# Patient Record
Sex: MALE | Race: WHITE | Employment: OTHER | URBAN - METROPOLITAN AREA
[De-identification: names, ages, dates, MRNs, and addresses within clinical notes are randomized per-mention and may not be internally consistent; named-entity substitution may affect disease eponyms.]

---

## 2017-07-28 LAB
AVERAGE GLUCOSE: NORMAL
HBA1C MFR BLD: 8.6 %

## 2017-09-06 ENCOUNTER — OFFICE VISIT (OUTPATIENT)
Dept: INTERNAL MEDICINE | Age: 72
End: 2017-09-06

## 2017-09-06 VITALS — DIASTOLIC BLOOD PRESSURE: 80 MMHG | SYSTOLIC BLOOD PRESSURE: 124 MMHG | BODY MASS INDEX: 27.8 KG/M2 | WEIGHT: 205 LBS

## 2017-09-06 DIAGNOSIS — R80.9 PROTEINURIA, UNSPECIFIED TYPE: ICD-10-CM

## 2017-09-06 DIAGNOSIS — Z23 NEED FOR INFLUENZA VACCINATION: Primary | ICD-10-CM

## 2017-09-06 DIAGNOSIS — R97.20 ELEVATED PROSTATE SPECIFIC ANTIGEN (PSA): ICD-10-CM

## 2017-09-06 DIAGNOSIS — E11.3299 MILD NONPROLIFERATIVE DIABETIC RETINOPATHY WITHOUT MACULAR EDEMA ASSOCIATED WITH TYPE 2 DIABETES MELLITUS, UNSPECIFIED LATERALITY (HCC): ICD-10-CM

## 2017-09-06 DIAGNOSIS — E78.5 OTHER AND UNSPECIFIED HYPERLIPIDEMIA: ICD-10-CM

## 2017-09-06 DIAGNOSIS — I10 ESSENTIAL HYPERTENSION: ICD-10-CM

## 2017-09-06 DIAGNOSIS — Z00.00 ROUTINE GENERAL MEDICAL EXAMINATION AT A HEALTH CARE FACILITY: ICD-10-CM

## 2017-09-06 PROCEDURE — G0439 PPPS, SUBSEQ VISIT: HCPCS | Performed by: INTERNAL MEDICINE

## 2017-09-06 PROCEDURE — 90662 IIV NO PRSV INCREASED AG IM: CPT | Performed by: INTERNAL MEDICINE

## 2017-09-06 PROCEDURE — 99214 OFFICE O/P EST MOD 30 MIN: CPT | Performed by: INTERNAL MEDICINE

## 2017-09-06 PROCEDURE — G0008 ADMIN INFLUENZA VIRUS VAC: HCPCS | Performed by: INTERNAL MEDICINE

## 2017-09-06 ASSESSMENT — ENCOUNTER SYMPTOMS
SINUS PRESSURE: 0
ABDOMINAL PAIN: 0
RESPIRATORY NEGATIVE: 1
SHORTNESS OF BREATH: 0
CHEST TIGHTNESS: 0
BACK PAIN: 0
WHEEZING: 0

## 2017-09-06 ASSESSMENT — LIFESTYLE VARIABLES: HOW OFTEN DO YOU HAVE A DRINK CONTAINING ALCOHOL: 0

## 2017-09-06 ASSESSMENT — ANXIETY QUESTIONNAIRES: GAD7 TOTAL SCORE: 0

## 2017-09-06 ASSESSMENT — PATIENT HEALTH QUESTIONNAIRE - PHQ9: SUM OF ALL RESPONSES TO PHQ QUESTIONS 1-9: 0

## 2018-06-06 ENCOUNTER — TELEPHONE (OUTPATIENT)
Dept: INTERNAL MEDICINE | Age: 73
End: 2018-06-06

## 2018-06-06 DIAGNOSIS — Z80.0 FAMILY HISTORY OF COLON CANCER REQUIRING SCREENING COLONOSCOPY: Primary | ICD-10-CM

## 2018-06-06 LAB
AVERAGE GLUCOSE: NORMAL
HBA1C MFR BLD: 8.5 %

## 2018-09-07 ENCOUNTER — OFFICE VISIT (OUTPATIENT)
Dept: INTERNAL MEDICINE | Age: 73
End: 2018-09-07

## 2018-09-07 VITALS
DIASTOLIC BLOOD PRESSURE: 67 MMHG | BODY MASS INDEX: 29.39 KG/M2 | HEIGHT: 72 IN | SYSTOLIC BLOOD PRESSURE: 126 MMHG | WEIGHT: 217 LBS

## 2018-09-07 DIAGNOSIS — E78.00 PURE HYPERCHOLESTEROLEMIA: ICD-10-CM

## 2018-09-07 DIAGNOSIS — M76.62 ACHILLES TENDINITIS OF LEFT LOWER EXTREMITY: ICD-10-CM

## 2018-09-07 DIAGNOSIS — Z23 NEED FOR PNEUMOCOCCAL VACCINE: ICD-10-CM

## 2018-09-07 DIAGNOSIS — E11.3299 MILD NONPROLIFERATIVE DIABETIC RETINOPATHY WITHOUT MACULAR EDEMA ASSOCIATED WITH TYPE 2 DIABETES MELLITUS, UNSPECIFIED LATERALITY (HCC): ICD-10-CM

## 2018-09-07 DIAGNOSIS — Z00.00 ROUTINE GENERAL MEDICAL EXAMINATION AT A HEALTH CARE FACILITY: Primary | ICD-10-CM

## 2018-09-07 DIAGNOSIS — I10 ESSENTIAL HYPERTENSION: ICD-10-CM

## 2018-09-07 DIAGNOSIS — R80.9 PROTEINURIA, UNSPECIFIED TYPE: ICD-10-CM

## 2018-09-07 DIAGNOSIS — R97.20 ELEVATED PROSTATE SPECIFIC ANTIGEN (PSA): ICD-10-CM

## 2018-09-07 DIAGNOSIS — K21.9 GASTROESOPHAGEAL REFLUX DISEASE WITHOUT ESOPHAGITIS: ICD-10-CM

## 2018-09-07 PROCEDURE — G0439 PPPS, SUBSEQ VISIT: HCPCS | Performed by: INTERNAL MEDICINE

## 2018-09-07 PROCEDURE — 90732 PPSV23 VACC 2 YRS+ SUBQ/IM: CPT | Performed by: INTERNAL MEDICINE

## 2018-09-07 PROCEDURE — G0009 ADMIN PNEUMOCOCCAL VACCINE: HCPCS | Performed by: INTERNAL MEDICINE

## 2018-09-07 PROCEDURE — 93000 ELECTROCARDIOGRAM COMPLETE: CPT | Performed by: INTERNAL MEDICINE

## 2018-09-07 PROCEDURE — 99214 OFFICE O/P EST MOD 30 MIN: CPT | Performed by: INTERNAL MEDICINE

## 2018-09-07 ASSESSMENT — PATIENT HEALTH QUESTIONNAIRE - PHQ9
SUM OF ALL RESPONSES TO PHQ QUESTIONS 1-9: 0
SUM OF ALL RESPONSES TO PHQ QUESTIONS 1-9: 0

## 2018-09-07 ASSESSMENT — ENCOUNTER SYMPTOMS
RESPIRATORY NEGATIVE: 1
WHEEZING: 0
CHEST TIGHTNESS: 0
SHORTNESS OF BREATH: 0
ABDOMINAL PAIN: 0
BACK PAIN: 0
SINUS PRESSURE: 0

## 2018-09-07 ASSESSMENT — ANXIETY QUESTIONNAIRES: GAD7 TOTAL SCORE: 0

## 2018-09-07 ASSESSMENT — LIFESTYLE VARIABLES: HOW OFTEN DO YOU HAVE A DRINK CONTAINING ALCOHOL: 0

## 2018-09-07 NOTE — PROGRESS NOTES
No respiratory distress. Abdominal: He exhibits no distension. There is no tenderness. There is no rebound and no guarding. Musculoskeletal: He exhibits no edema. Still some pain over achilles tendon insertion   Lymphadenopathy:     He has no cervical adenopathy. Neurological: He is alert and oriented to person, place, and time. He has normal reflexes. No cranial nerve deficit. Skin: Skin is warm and dry. No erythema. Psychiatric: He has a normal mood and affect. His behavior is normal. Judgment and thought content normal.         Assessment and Plan:      Achilles tendinitis of left lower extremity  Cont to f/u w Dr. Tracey Walters hypertension  Controlled w current regimen- continue and monitor closely      Type 2 diabetes mellitus, uncontrolled ([address]  Cont to f/u w Dr. Sorin Walker     Mild nonproliferative diabetic retinopathy without macular edema associated with type 2 diabetes mellitus ([address]  Work on better diabetes control and cont to f/u ophthalmology     Pure hypercholesterolemia  Cont pravachol- endo can see if needs higher intensity     PROTEINURIA  Need to control all risk factors- monitor blood pressure, blood sugars , and lipids carefully and treat aggressively to avoid progression of renal disease        Esophageal Reflux  Watch diet more carefully-sl worse w dietary indiscretions recently        Encounter Diagnoses   Name Primary?     Routine general medical examination at a health care facility Yes    Achilles tendinitis of left lower extremity     Essential hypertension     Uncontrolled type 2 diabetes mellitus with diabetic polyneuropathy, with long-term current use of insulin (HCC)     Mild nonproliferative diabetic retinopathy without macular edema associated with type 2 diabetes mellitus, unspecified laterality ([address]     Pure hypercholesterolemia     Need for pneumococcal vaccine     Proteinuria, unspecified type     Gastroesophageal reflux disease without esophagitis

## 2018-09-07 NOTE — PROGRESS NOTES
Medicare Annual Wellness Visit  Name: Catracho Celestin Date: 2018   MRN: [id number] Sex: Male   Age: 72 y.o. Ethnicity: Non-/Non    : 1945 Race: Kina Crowley is here for Medicare AWV    Screenings for behavioral, psychosocial and functional/safety risks, and cognitive dysfunction are all negative except as indicated below. These results, as well as other patient data from the [address] form, are documented in Flowsheets linked to this Encounter. Allergies   Allergen Reactions    Lisinopril Swelling     Prior to Visit Medications    Medication Sig Taking? Authorizing Provider   glipiZIDE-metformin (METAGLIP) 5-500 MG per tablet Take 2 tablets by mouth 2 times daily (before meals) Yes Neyda Payton MD   pioglitazone (ACTOS) 15 MG tablet Take 3 tablets by mouth daily. Yes Neyda Payton MD   pravastatin (PRAVACHOL) 40 MG tablet Take 1 tablet by mouth every evening. Yes Neyda Payton MD   losartan-hydrochlorothiazide (HYZAAR) 100-12.5 MG per tablet Take 1 tablet by mouth daily. Yes Neyda Payton MD   amlodipine (NORVASC) 5 MG tablet Take 5 mg by mouth daily. Yes Historical Provider, MD   omeprazole (PRILOSEC) 20 MG capsule Take 20 mg by mouth daily. Yes Historical Provider, MD   aspirin 81 MG EC tablet Take 81 mg by mouth daily. Yes Historical Provider, MD   multivitamin [facility]) per tablet Take 1 tablet by mouth daily. Yes Historical Provider, MD   cetirizine (ZYRTEC) 10 MG tablet Take 10 mg by mouth daily. Yes Historical Provider, MD   insulin detemir (LEVEMIR) 100 UNIT/ML injection Inject  into the skin nightly. Yes Historical Provider, MD   fluticasone (FLONASE) 50 MCG/ACT nasal spray 1 spray by Nasal route daily.   Neyda Payton MD     Past Medical History:   Diagnosis Date    Elevated prostate specific antigen (PSA)     Esophageal reflux     Other and unspecified hyperlipidemia     Screening PSA (prostate specific antigen) 2009    2.9    Type Health Maintenance   Topic Date Due    Hepatitis C screen  1945    Potassium monitoring  11/08/2011    Creatinine monitoring  11/08/2011    Shingles Vaccine (1 of 2 - 2 Dose Series) 11/06/2014    Diabetic foot exam  07/23/2015    Lipid screen  07/23/2015    Pneumococcal low/med risk (2 of 2 - PPSV23) 07/18/2017    A1C test (Diabetic or Prediabetic)  07/28/2018    Flu vaccine (1) 09/01/2018    Diabetic retinal exam  02/14/2019    DTaP/Tdap/Td vaccine (2 - Td) 09/17/2019    Colon cancer screen colonoscopy  03/18/2025     Recommendations for Preventive Services Due: see orders and patient instructions/AVS.  .   Recommended screening schedule for the next 5-10 years is provided to the patient in written form: see Patient Instructions/AVS.

## 2019-03-05 ENCOUNTER — TELEPHONE (OUTPATIENT)
Dept: ENDOCRINOLOGY | Age: 74
End: 2019-03-05

## 2019-03-18 PROBLEM — E66.3 OVERWEIGHT (BMI 25.0-29.9): Status: ACTIVE | Noted: 2019-03-18

## 2019-03-19 ENCOUNTER — OFFICE VISIT (OUTPATIENT)
Dept: ENDOCRINOLOGY | Age: 74
End: 2019-03-19
Payer: MEDICARE

## 2019-03-19 VITALS
HEIGHT: 72 IN | WEIGHT: 220.4 LBS | DIASTOLIC BLOOD PRESSURE: 81 MMHG | BODY MASS INDEX: 29.85 KG/M2 | HEART RATE: 72 BPM | SYSTOLIC BLOOD PRESSURE: 131 MMHG

## 2019-03-19 DIAGNOSIS — I10 ESSENTIAL HYPERTENSION: ICD-10-CM

## 2019-03-19 DIAGNOSIS — E11.22 CKD STAGE 3 SECONDARY TO DIABETES (HCC): ICD-10-CM

## 2019-03-19 DIAGNOSIS — E78.00 PURE HYPERCHOLESTEROLEMIA: ICD-10-CM

## 2019-03-19 DIAGNOSIS — N18.30 CKD STAGE 3 SECONDARY TO DIABETES (HCC): ICD-10-CM

## 2019-03-19 DIAGNOSIS — E66.3 OVERWEIGHT (BMI 25.0-29.9): ICD-10-CM

## 2019-03-19 DIAGNOSIS — E11.3299 MILD NONPROLIFERATIVE DIABETIC RETINOPATHY WITHOUT MACULAR EDEMA ASSOCIATED WITH TYPE 2 DIABETES MELLITUS, UNSPECIFIED LATERALITY (HCC): ICD-10-CM

## 2019-03-19 PROCEDURE — 99204 OFFICE O/P NEW MOD 45 MIN: CPT | Performed by: INTERNAL MEDICINE

## 2019-06-19 LAB
A/G RATIO: 1.7 (ref 1.1–2.2)
ALBUMIN SERPL-MCNC: 4 G/DL (ref 3.4–5)
ALP BLD-CCNC: 49 U/L (ref 40–129)
ALT SERPL-CCNC: 12 U/L (ref 10–40)
ANION GAP SERPL CALCULATED.3IONS-SCNC: 13 MMOL/L (ref 3–16)
AST SERPL-CCNC: 15 U/L (ref 15–37)
BILIRUB SERPL-MCNC: 0.3 MG/DL (ref 0–1)
BUN BLDV-MCNC: 25 MG/DL (ref 7–20)
CALCIUM SERPL-MCNC: 8.9 MG/DL (ref 8.3–10.6)
CHLORIDE BLD-SCNC: 93 MMOL/L (ref 99–110)
CHOLESTEROL, TOTAL: 122 MG/DL (ref 0–199)
CO2: 21 MMOL/L (ref 21–32)
CREAT SERPL-MCNC: 1.2 MG/DL (ref 0.8–1.3)
CREATININE URINE: 74.4 MG/DL (ref 39–259)
ESTIMATED AVERAGE GLUCOSE: 194.4 MG/DL
GFR AFRICAN AMERICAN: >60
GFR NON-AFRICAN AMERICAN: 60
GLOBULIN: 2.4 G/DL
GLUCOSE BLD-MCNC: 87 MG/DL (ref 70–99)
HBA1C MFR BLD: 8.4 %
HDLC SERPL-MCNC: 46 MG/DL (ref 40–60)
LDL CHOLESTEROL CALCULATED: 58 MG/DL
MICROALBUMIN UR-MCNC: 6.7 MG/DL
MICROALBUMIN/CREAT UR-RTO: 90.1 MG/G (ref 0–30)
POTASSIUM SERPL-SCNC: 4.1 MMOL/L (ref 3.5–5.1)
SODIUM BLD-SCNC: 127 MMOL/L (ref 136–145)
TOTAL PROTEIN: 6.4 G/DL (ref 6.4–8.2)
TRIGL SERPL-MCNC: 91 MG/DL (ref 0–150)
VLDLC SERPL CALC-MCNC: 18 MG/DL

## 2019-06-21 LAB — C-PEPTIDE: 1.7 NG/ML (ref 1.1–4.4)

## 2019-06-27 ENCOUNTER — OFFICE VISIT (OUTPATIENT)
Dept: ENDOCRINOLOGY | Age: 74
End: 2019-06-27
Payer: MEDICARE

## 2019-06-27 VITALS
DIASTOLIC BLOOD PRESSURE: 77 MMHG | HEART RATE: 74 BPM | HEIGHT: 72 IN | OXYGEN SATURATION: 99 % | BODY MASS INDEX: 29.5 KG/M2 | SYSTOLIC BLOOD PRESSURE: 132 MMHG | WEIGHT: 217.8 LBS

## 2019-06-27 DIAGNOSIS — E11.3299 MILD NONPROLIFERATIVE DIABETIC RETINOPATHY WITHOUT MACULAR EDEMA ASSOCIATED WITH TYPE 2 DIABETES MELLITUS, UNSPECIFIED LATERALITY (HCC): ICD-10-CM

## 2019-06-27 DIAGNOSIS — E78.00 PURE HYPERCHOLESTEROLEMIA: ICD-10-CM

## 2019-06-27 DIAGNOSIS — E66.3 OVERWEIGHT (BMI 25.0-29.9): ICD-10-CM

## 2019-06-27 DIAGNOSIS — I10 ESSENTIAL HYPERTENSION: ICD-10-CM

## 2019-06-27 PROCEDURE — 99214 OFFICE O/P EST MOD 30 MIN: CPT | Performed by: INTERNAL MEDICINE

## 2019-06-27 RX ORDER — CHLORAL HYDRATE 500 MG
1000 CAPSULE ORAL DAILY
COMMUNITY

## 2019-06-27 RX ORDER — TADALAFIL 10 MG/1
10 TABLET ORAL PRN
COMMUNITY
End: 2019-08-06 | Stop reason: SDUPTHER

## 2019-06-27 RX ORDER — PIOGLITAZONEHYDROCHLORIDE 15 MG/1
45 TABLET ORAL DAILY
Qty: 90 TABLET | Refills: 1 | Status: SHIPPED | OUTPATIENT
Start: 2019-06-27 | End: 2019-07-02 | Stop reason: SDUPTHER

## 2019-06-27 RX ORDER — GLIPIZIDE AND METFORMIN HCL 5; 500 MG/1; MG/1
2 TABLET, FILM COATED ORAL
Qty: 360 TABLET | Refills: 1 | Status: SHIPPED | OUTPATIENT
Start: 2019-06-27 | End: 2020-02-26

## 2019-06-27 RX ORDER — LOSARTAN POTASSIUM AND HYDROCHLOROTHIAZIDE 12.5; 1 MG/1; MG/1
1 TABLET ORAL DAILY
Qty: 90 TABLET | Refills: 1 | Status: SHIPPED | OUTPATIENT
Start: 2019-06-27 | End: 2020-02-26

## 2019-06-27 RX ORDER — PRAVASTATIN SODIUM 40 MG
40 TABLET ORAL EVERY EVENING
Qty: 90 TABLET | Refills: 1 | Status: SHIPPED | OUTPATIENT
Start: 2019-06-27 | End: 2019-11-04

## 2019-06-27 RX ORDER — CETIRIZINE HYDROCHLORIDE 10 MG/1
10 TABLET ORAL DAILY
COMMUNITY

## 2019-06-27 NOTE — PROGRESS NOTES
Stefanie Munguia is a 69 y.o. male who presents for Type 2 diabetes mellitus. Current symptoms/problems include hyperglycemia and are worsening. 1.  DM type 2, uncontrolled, with neuropathy ([facility] [E11.40, E11.65]    Diagnosed with Type 2 diabetes mellitus in 1979.     Comorbid conditions: hyperlipidemia, Neuropathy and Retinopathy    Current diabetic medications include: metaglip 5/500 mg 2 tabs bid, pioglitazone 15 mg qd, Lantus 28 units qhs    Intolerance to diabetes medications: No     Weight trend: stable  Prior visit with dietician: yes  Current diet: on average, 2 meals per day, eats moderately healthy  Current exercise: walks     Current monitoring regimen: home blood tests - 1 times daily  Has brought blood glucose log/meter:  Yes  Home blood sugar records: fasting range: 130-140 and postprandial range:    Any episodes of hypoglycemia? No  Hypoglycemia frequency and time(s):    Does patient have Glucagon emergency kit? No  Does patient have rapid acting carbohydrate? Yes  Does patient wear a medic alert bracelet or necklace? No    2. Mild nonproliferative diabetic retinopathy without macular edema associated with type 2 diabetes mellitus, unspecified laterality ([address]  Vision is OK. 3. Pure hypercholesterolemia  No muscle pain. 4. Essential hypertension  No headaches. 5. Overweight (BMI 25.0-29. 9)  Eats moderately healthy. Walks.     6. CKD stage 3 secondary to diabetes [facility])  Has prostate problems, see Urologist.      Past Medical History:   Diagnosis Date    Elevated prostate specific antigen (PSA)     Esophageal reflux     Other and unspecified hyperlipidemia     Screening PSA (prostate specific antigen) 9/17/2009    2.9    Type 2 diabetes mellitus, uncontrolled ([facility]     Dr. 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, not stated as uncontrolled(250.50)     Type II or unspecified type diabetes mellitus without mention of complication, not stated as uncontrolled     Unspecified essential hypertension       Patient Active Problem List   Diagnosis    Pure hypercholesterolemia    Essential hypertension    Esophageal reflux    Mild nonproliferative diabetic retinopathy without macular edema associated with type 2 diabetes mellitus (HCC)    Elevated prostate specific antigen (PSA)    Proteinuria    Achilles tendinitis of left lower extremity    DM type 2, uncontrolled, with neuropathy ([address]    Overweight (BMI 25.0-29. 9)    CKD stage 3 secondary to diabetes [facility])     Past Surgical History:   Procedure Laterality Date    COLONOSCOPY  8/5/2009    Normal-moreno 2012-14    HERNIA REPAIR      TONSILLECTOMY       Social History     Socioeconomic History    Marital status:      Spouse name: Not on file    Number of children: Not on file    Years of education: Not on file    Highest education level: Not on file   Occupational History    Not on file   Social Needs    Financial resource strain: Not on file    Food insecurity:     Worry: Not on file     Inability: Not on file    Transportation needs:     Medical: Not on file     Non-medical: Not on file   Tobacco Use    Smoking status: Never Smoker    Smokeless tobacco: Never Used   Substance and Sexual Activity    Alcohol use: No    Drug use: No    Sexual activity: Yes     Partners: Female   Lifestyle    Physical activity:     Days per week: Not on file     Minutes per session: Not on file    Stress: Not on file   Relationships    Social connections:     Talks on phone: Not on file     Gets together: Not on file     Attends Zoroastrian service: Not on file     Active member of club or organization: Not on file     Attends meetings of clubs or organizations: Not on file     Relationship status: Not on file    Intimate partner violence:     Fear of current or ex partner: Not on file     Emotionally abused: Not on file     Physically abused: Not on file     Forced sexual activity: CL 93 06/19/2019    CO2 21 06/19/2019    BUN 25 06/19/2019    CREATININE 1.2 06/19/2019    GLUCOSE 87 06/19/2019    CALCIUM 8.9 06/19/2019    PROT 6.4 06/19/2019    PROT 6.9 11/08/2010    LABALBU 4.0 06/19/2019    BILITOT 0.3 06/19/2019    ALKPHOS 49 06/19/2019    AST 15 06/19/2019    ALT 12 06/19/2019    LABGLOM 60 06/19/2019    LABGLOM 53 11/08/2010    GFRAA >60 06/19/2019    AGRATIO 1.7 06/19/2019    GLOB 2.4 06/19/2019     Lab Results   Component Value Date    TSH 1.29 11/08/2010     Lab Results   Component Value Date    LABA1C 8.4 06/19/2019     Lab Results   Component Value Date    .4 06/19/2019     Lab Results   Component Value Date    CHOL 122 06/19/2019     Lab Results   Component Value Date    TRIG 91 06/19/2019     Lab Results   Component Value Date    HDL 46 06/19/2019     Lab Results   Component Value Date    LDLCALC 58 06/19/2019     Lab Results   Component Value Date    LABVLDL 18 06/19/2019     Lab Results   Component Value Date    CHOLHDLRATIO 2.9 11/08/2010     Lab Results   Component Value Date    LABMICR 6.70 06/19/2019     Lab Results   Component Value Date    VITD25 37 11/08/2010        Review of Systems:  Constitutional: no fatigue, no fever, no recent weight gain, no recent weight loss, no changes in appetite  Eyes: no eye pain, no change in vision, no eye redness, no eye irritation, no double vision  Ears, nose, throat: no sore throat, no earache, no decrease in hearing, no hoarseness, no dry mouth, has sinus problems, no difficulty swallowing, no neck lumps, no dental problems, no mouth sores, no ringing in ears  Pulmonary: no shortness of breath, no wheezing, no dyspnea on exertion, no cough  Cardiovascular: no chest pain, no lower extremity edema, no orthopnea, no intermittent leg claudication, no palpitations  Gastrointestinal: no abdominal pain, no nausea, no vomiting, no diarrhea, no constipation, no dysphagia, has heartburn, no bloating  Genitourinary: no dysuria, no urinary incontinence, no urinary hesitancy, no urinary frequency, no feelings of urinary urgency, has nocturia  Musculoskeletal: no joint swelling, no joint stiffness, has joint pain, no muscle cramps, no muscle pain  Integument/Breast: no skin rashes, no skin lesions, no itching, no dry skin  Neurological: no numbness, no tingling, no weakness, no confusion, no headaches, no dizziness, no fainting, no tremors, no decrease in memory, no balance problems  Psychiatric: no anxiety, no depression, no insomnia  Hematologic/Lymphatic: no tendency for easy bleeding, no swollen lymph nodes, no tendency for easy bruising  Immunology: has seasonal allergies, no frequent infections, no frequent illnesses  Endocrine: no temperature intolerance    /77 (Site: Left Upper Arm, Position: Sitting, Cuff Size: Medium Adult)   Pulse 74   Ht 6' (1.829 m)   Wt 217 lb 12.8 oz (98.8 kg)   SpO2 99%   BMI 29.54 kg/m²    Wt Readings from Last 3 Encounters:   06/27/19 217 lb 12.8 oz (98.8 kg)   03/19/19 220 lb 6.4 oz (100 kg)   09/07/18 217 lb (98.4 kg)     Body mass index is 29.54 kg/m².       OBJECTIVE:  Constitutional: no acute distress, well appearing and well nourished  Psychiatric: oriented to person, place and time, judgement and insight and normal, recent and remote memory and intact and mood and affect are normal  Skin: skin and subcutaneous tissue is normal without mass, normal turgor  Head and Face: examination of head and face revealed no abnormalities  Eyes: no lid or conjunctival swelling, erythema or discharge, pupils are normal, equal, round, reactive to light  Ears/Nose: external inspection of ears and nose revealed no abnormalities, hearing is grossly normal  Oropharynx/Mouth/Face: lips, tongue and gums are normal with no lesions, the voice quality was normal  Neck: neck is supple and symmetric, with midline trachea and no masses, thyroid is normal  Lymphatics: normal cervical lymph nodes, normal supraclavicular and/or ordered other diagnostic tests No  Discussed test results with performing physician No  Independently reviewed image, tracing, or specimen No  Made a decision to obtain old records No  Reviewed and summarized old records Yes  Hemoglobin A1c 8.5  Obtained history from other than patient No    Selina Cleveland was counseled regarding symptoms of diabetes diagnosis, course and complications of disease if inadequately treated, side effects of medications, diagnosis, treatment options, and prognosis, risks, benefits, complications, and alternatives of treatment, labs, imaging and other studies and treatment targets and goals. He understands instructions and counseling. Return in about 3 months (around 9/27/2019) for diabetes.

## 2019-07-02 ENCOUNTER — TELEPHONE (OUTPATIENT)
Dept: ENDOCRINOLOGY | Age: 74
End: 2019-07-02

## 2019-07-02 RX ORDER — PIOGLITAZONEHYDROCHLORIDE 15 MG/1
15 TABLET ORAL DAILY
Qty: 90 TABLET | Refills: 1 | Status: SHIPPED | OUTPATIENT
Start: 2019-07-02 | End: 2020-02-26

## 2019-08-01 RX ORDER — TADALAFIL 10 MG/1
10 TABLET ORAL PRN
Qty: 90 TABLET | Refills: 1 | OUTPATIENT
Start: 2019-08-01

## 2019-08-01 RX ORDER — AMLODIPINE BESYLATE 5 MG/1
5 TABLET ORAL DAILY
Qty: 90 TABLET | Refills: 1 | OUTPATIENT
Start: 2019-08-01

## 2019-08-01 RX ORDER — OMEPRAZOLE 20 MG/1
20 CAPSULE, DELAYED RELEASE ORAL DAILY
Qty: 90 CAPSULE | Refills: 1 | OUTPATIENT
Start: 2019-08-01

## 2019-08-01 NOTE — TELEPHONE ENCOUNTER
LOV: 6/27/19  NOV: 9/24/19    Medication: Omeprazole  Dose: 20MG  Sig: TAKE 1 CAPSULE BY MOUTH DAILY  Route: Oral  Quantity: 90    Medication:Amlodipine  Dose: 5MG  Sig: TAKE 1 TABLET BY MOUTH DAILY  Route: Oral  Quantity: 90    Medication: Tadalafil  Dose: 10MG  Sig: TAKE 1 TABLET BY MOUTH AS NEEDED FOR ED  Route: Oral  Quantity: 90

## 2019-08-06 ENCOUNTER — TELEPHONE (OUTPATIENT)
Dept: INTERNAL MEDICINE CLINIC | Age: 74
End: 2019-08-06

## 2019-08-06 RX ORDER — TADALAFIL 10 MG/1
10 TABLET ORAL PRN
Qty: 30 TABLET | Refills: 3 | Status: SHIPPED | OUTPATIENT
Start: 2019-08-06 | End: 2020-08-03 | Stop reason: ALTCHOICE

## 2019-08-06 RX ORDER — AMLODIPINE BESYLATE 5 MG/1
5 TABLET ORAL DAILY
Qty: 90 TABLET | Refills: 3 | Status: SHIPPED | OUTPATIENT
Start: 2019-08-06 | End: 2020-07-31

## 2019-08-06 RX ORDER — OMEPRAZOLE 20 MG/1
20 CAPSULE, DELAYED RELEASE ORAL DAILY
Qty: 90 CAPSULE | Refills: 3 | Status: SHIPPED | OUTPATIENT
Start: 2019-08-06 | End: 2020-07-31

## 2019-09-17 LAB
A/G RATIO: 1.8 (ref 1.1–2.2)
ALBUMIN SERPL-MCNC: 4.7 G/DL (ref 3.4–5)
ALP BLD-CCNC: 59 U/L (ref 40–129)
ALT SERPL-CCNC: 15 U/L (ref 10–40)
ANION GAP SERPL CALCULATED.3IONS-SCNC: 13 MMOL/L (ref 3–16)
AST SERPL-CCNC: 17 U/L (ref 15–37)
BILIRUB SERPL-MCNC: 0.5 MG/DL (ref 0–1)
BUN BLDV-MCNC: 24 MG/DL (ref 7–20)
CALCIUM SERPL-MCNC: 10.2 MG/DL (ref 8.3–10.6)
CHLORIDE BLD-SCNC: 102 MMOL/L (ref 99–110)
CHOLESTEROL, TOTAL: 143 MG/DL (ref 0–199)
CO2: 25 MMOL/L (ref 21–32)
CREAT SERPL-MCNC: 1.3 MG/DL (ref 0.8–1.3)
CREATININE URINE: 121.2 MG/DL (ref 39–259)
GFR AFRICAN AMERICAN: >60
GFR NON-AFRICAN AMERICAN: 54
GLOBULIN: 2.6 G/DL
GLUCOSE BLD-MCNC: 165 MG/DL (ref 70–99)
HDLC SERPL-MCNC: 48 MG/DL (ref 40–60)
LDL CHOLESTEROL CALCULATED: 73 MG/DL
MICROALBUMIN UR-MCNC: 14.1 MG/DL
MICROALBUMIN/CREAT UR-RTO: 116.3 MG/G (ref 0–30)
POTASSIUM SERPL-SCNC: 5.4 MMOL/L (ref 3.5–5.1)
SODIUM BLD-SCNC: 140 MMOL/L (ref 136–145)
TOTAL PROTEIN: 7.3 G/DL (ref 6.4–8.2)
TRIGL SERPL-MCNC: 111 MG/DL (ref 0–150)
VLDLC SERPL CALC-MCNC: 22 MG/DL

## 2019-09-18 LAB
ESTIMATED AVERAGE GLUCOSE: 174.3 MG/DL
HBA1C MFR BLD: 7.7 %

## 2019-09-20 ENCOUNTER — OFFICE VISIT (OUTPATIENT)
Dept: INTERNAL MEDICINE CLINIC | Age: 74
End: 2019-09-20
Payer: MEDICARE

## 2019-09-20 VITALS
WEIGHT: 214 LBS | DIASTOLIC BLOOD PRESSURE: 80 MMHG | HEIGHT: 72 IN | BODY MASS INDEX: 28.99 KG/M2 | SYSTOLIC BLOOD PRESSURE: 130 MMHG

## 2019-09-20 DIAGNOSIS — Z11.59 NEED FOR HEPATITIS C SCREENING TEST: Primary | ICD-10-CM

## 2019-09-20 DIAGNOSIS — E87.1 HYPONATREMIA: ICD-10-CM

## 2019-09-20 DIAGNOSIS — Z23 NEED FOR PROPHYLACTIC VACCINATION AGAINST DIPHTHERIA-TETANUS-PERTUSSIS (DTP): ICD-10-CM

## 2019-09-20 DIAGNOSIS — I10 ESSENTIAL HYPERTENSION: ICD-10-CM

## 2019-09-20 DIAGNOSIS — R35.0 URINARY FREQUENCY: ICD-10-CM

## 2019-09-20 DIAGNOSIS — E11.3299 MILD NONPROLIFERATIVE DIABETIC RETINOPATHY WITHOUT MACULAR EDEMA ASSOCIATED WITH TYPE 2 DIABETES MELLITUS, UNSPECIFIED LATERALITY (HCC): ICD-10-CM

## 2019-09-20 DIAGNOSIS — R80.9 PROTEINURIA, UNSPECIFIED TYPE: ICD-10-CM

## 2019-09-20 DIAGNOSIS — Z00.00 ROUTINE GENERAL MEDICAL EXAMINATION AT A HEALTH CARE FACILITY: ICD-10-CM

## 2019-09-20 DIAGNOSIS — E11.22 CKD STAGE 3 SECONDARY TO DIABETES (HCC): ICD-10-CM

## 2019-09-20 DIAGNOSIS — E66.3 OVERWEIGHT (BMI 25.0-29.9): ICD-10-CM

## 2019-09-20 DIAGNOSIS — N18.30 CKD STAGE 3 SECONDARY TO DIABETES (HCC): ICD-10-CM

## 2019-09-20 DIAGNOSIS — Z23 NEED FOR TDAP VACCINATION: ICD-10-CM

## 2019-09-20 LAB — HEPATITIS C ANTIBODY INTERPRETATION: NORMAL

## 2019-09-20 PROCEDURE — 93000 ELECTROCARDIOGRAM COMPLETE: CPT | Performed by: INTERNAL MEDICINE

## 2019-09-20 PROCEDURE — 99214 OFFICE O/P EST MOD 30 MIN: CPT | Performed by: INTERNAL MEDICINE

## 2019-09-20 PROCEDURE — G0439 PPPS, SUBSEQ VISIT: HCPCS | Performed by: INTERNAL MEDICINE

## 2019-09-20 ASSESSMENT — PATIENT HEALTH QUESTIONNAIRE - PHQ9
SUM OF ALL RESPONSES TO PHQ QUESTIONS 1-9: 0
SUM OF ALL RESPONSES TO PHQ QUESTIONS 1-9: 0

## 2019-09-20 ASSESSMENT — ENCOUNTER SYMPTOMS
RESPIRATORY NEGATIVE: 1
CHEST TIGHTNESS: 0
SHORTNESS OF BREATH: 0
ABDOMINAL PAIN: 0

## 2019-09-20 NOTE — ASSESSMENT & PLAN NOTE
Pt agrees to continue to work on calorie and carb restriction and exercise.  Pt aware of the medical dangers of being overweight

## 2019-09-24 ENCOUNTER — OFFICE VISIT (OUTPATIENT)
Dept: ENDOCRINOLOGY | Age: 74
End: 2019-09-24
Payer: MEDICARE

## 2019-09-24 VITALS
DIASTOLIC BLOOD PRESSURE: 78 MMHG | SYSTOLIC BLOOD PRESSURE: 132 MMHG | HEART RATE: 71 BPM | BODY MASS INDEX: 29.47 KG/M2 | WEIGHT: 217.6 LBS | HEIGHT: 72 IN | OXYGEN SATURATION: 99 %

## 2019-09-24 DIAGNOSIS — N18.30 CKD STAGE 3 SECONDARY TO DIABETES (HCC): ICD-10-CM

## 2019-09-24 DIAGNOSIS — E11.3299 MILD NONPROLIFERATIVE DIABETIC RETINOPATHY WITHOUT MACULAR EDEMA ASSOCIATED WITH TYPE 2 DIABETES MELLITUS, UNSPECIFIED LATERALITY (HCC): ICD-10-CM

## 2019-09-24 DIAGNOSIS — E11.22 CKD STAGE 3 SECONDARY TO DIABETES (HCC): ICD-10-CM

## 2019-09-24 DIAGNOSIS — E78.00 PURE HYPERCHOLESTEROLEMIA: ICD-10-CM

## 2019-09-24 DIAGNOSIS — E66.3 OVERWEIGHT (BMI 25.0-29.9): ICD-10-CM

## 2019-09-24 DIAGNOSIS — I10 ESSENTIAL HYPERTENSION: ICD-10-CM

## 2019-09-24 PROCEDURE — 99214 OFFICE O/P EST MOD 30 MIN: CPT | Performed by: INTERNAL MEDICINE

## 2019-09-25 ENCOUNTER — TELEPHONE (OUTPATIENT)
Dept: ENDOCRINOLOGY | Age: 74
End: 2019-09-25

## 2019-09-25 NOTE — TELEPHONE ENCOUNTER
I can order diabetic shoe, but I do not prescribe shoe with any specifications and I do not know the details what is appropriate for him to have. My recommendation is to see podiatry for that and get order from them. I do not believe just ordering diabetic shoe will be sufficient and the best for him. If order has to come from me, ask podiatry to fill the form regarding what they think he needs. And then I can sign it.

## 2019-11-04 ENCOUNTER — OFFICE VISIT (OUTPATIENT)
Dept: INTERNAL MEDICINE CLINIC | Age: 74
End: 2019-11-04
Payer: MEDICARE

## 2019-11-04 VITALS
HEART RATE: 78 BPM | SYSTOLIC BLOOD PRESSURE: 135 MMHG | DIASTOLIC BLOOD PRESSURE: 84 MMHG | BODY MASS INDEX: 29.26 KG/M2 | WEIGHT: 216 LBS | RESPIRATION RATE: 16 BRPM | HEIGHT: 72 IN

## 2019-11-04 DIAGNOSIS — E11.3299 MILD NONPROLIFERATIVE DIABETIC RETINOPATHY WITHOUT MACULAR EDEMA ASSOCIATED WITH TYPE 2 DIABETES MELLITUS, UNSPECIFIED LATERALITY (HCC): ICD-10-CM

## 2019-11-04 DIAGNOSIS — H25.9 SENILE CATARACT OF RIGHT EYE, UNSPECIFIED AGE-RELATED CATARACT TYPE: ICD-10-CM

## 2019-11-04 DIAGNOSIS — I10 ESSENTIAL HYPERTENSION: ICD-10-CM

## 2019-11-04 DIAGNOSIS — E11.22 CKD STAGE 3 SECONDARY TO DIABETES (HCC): ICD-10-CM

## 2019-11-04 DIAGNOSIS — N18.30 CKD STAGE 3 SECONDARY TO DIABETES (HCC): ICD-10-CM

## 2019-11-04 DIAGNOSIS — E78.00 PURE HYPERCHOLESTEROLEMIA: ICD-10-CM

## 2019-11-04 DIAGNOSIS — E66.3 OVERWEIGHT (BMI 25.0-29.9): ICD-10-CM

## 2019-11-04 PROCEDURE — 99215 OFFICE O/P EST HI 40 MIN: CPT | Performed by: INTERNAL MEDICINE

## 2019-11-04 RX ORDER — ATORVASTATIN CALCIUM 40 MG/1
40 TABLET, FILM COATED ORAL DAILY
Qty: 90 TABLET | Refills: 3 | Status: SHIPPED | OUTPATIENT
Start: 2019-11-04 | End: 2020-10-14

## 2019-11-04 ASSESSMENT — PATIENT HEALTH QUESTIONNAIRE - PHQ9
SUM OF ALL RESPONSES TO PHQ QUESTIONS 1-9: 0
SUM OF ALL RESPONSES TO PHQ QUESTIONS 1-9: 0
2. FEELING DOWN, DEPRESSED OR HOPELESS: 0
SUM OF ALL RESPONSES TO PHQ9 QUESTIONS 1 & 2: 0
1. LITTLE INTEREST OR PLEASURE IN DOING THINGS: 0

## 2019-11-04 ASSESSMENT — ENCOUNTER SYMPTOMS
RESPIRATORY NEGATIVE: 1
CHEST TIGHTNESS: 0
SHORTNESS OF BREATH: 0
ABDOMINAL PAIN: 0

## 2019-12-02 ENCOUNTER — OFFICE VISIT (OUTPATIENT)
Dept: INTERNAL MEDICINE CLINIC | Age: 74
End: 2019-12-02
Payer: MEDICARE

## 2019-12-02 VITALS
DIASTOLIC BLOOD PRESSURE: 82 MMHG | SYSTOLIC BLOOD PRESSURE: 138 MMHG | TEMPERATURE: 98.4 F | WEIGHT: 215 LBS | HEIGHT: 72 IN | BODY MASS INDEX: 29.12 KG/M2

## 2019-12-02 DIAGNOSIS — E11.22 CKD STAGE 3 SECONDARY TO DIABETES (HCC): ICD-10-CM

## 2019-12-02 DIAGNOSIS — I10 ESSENTIAL HYPERTENSION: ICD-10-CM

## 2019-12-02 DIAGNOSIS — H25.9 SENILE CATARACT OF RIGHT EYE, UNSPECIFIED AGE-RELATED CATARACT TYPE: ICD-10-CM

## 2019-12-02 DIAGNOSIS — E78.00 PURE HYPERCHOLESTEROLEMIA: ICD-10-CM

## 2019-12-02 DIAGNOSIS — D64.9 ANEMIA, UNSPECIFIED TYPE: Primary | ICD-10-CM

## 2019-12-02 DIAGNOSIS — E11.3299 MILD NONPROLIFERATIVE DIABETIC RETINOPATHY WITHOUT MACULAR EDEMA ASSOCIATED WITH TYPE 2 DIABETES MELLITUS, UNSPECIFIED LATERALITY (HCC): ICD-10-CM

## 2019-12-02 DIAGNOSIS — N18.30 CKD STAGE 3 SECONDARY TO DIABETES (HCC): ICD-10-CM

## 2019-12-02 PROCEDURE — 99215 OFFICE O/P EST HI 40 MIN: CPT | Performed by: INTERNAL MEDICINE

## 2019-12-02 ASSESSMENT — ENCOUNTER SYMPTOMS
SHORTNESS OF BREATH: 0
CHEST TIGHTNESS: 0
RESPIRATORY NEGATIVE: 1
ABDOMINAL PAIN: 0

## 2019-12-02 ASSESSMENT — PATIENT HEALTH QUESTIONNAIRE - PHQ9
2. FEELING DOWN, DEPRESSED OR HOPELESS: 0
SUM OF ALL RESPONSES TO PHQ QUESTIONS 1-9: 0
SUM OF ALL RESPONSES TO PHQ9 QUESTIONS 1 & 2: 0
1. LITTLE INTEREST OR PLEASURE IN DOING THINGS: 0
SUM OF ALL RESPONSES TO PHQ QUESTIONS 1-9: 0

## 2019-12-16 DIAGNOSIS — E11.22 CKD STAGE 3 SECONDARY TO DIABETES (HCC): ICD-10-CM

## 2019-12-16 DIAGNOSIS — E78.00 PURE HYPERCHOLESTEROLEMIA: ICD-10-CM

## 2019-12-16 DIAGNOSIS — E11.3299 MILD NONPROLIFERATIVE DIABETIC RETINOPATHY WITHOUT MACULAR EDEMA ASSOCIATED WITH TYPE 2 DIABETES MELLITUS, UNSPECIFIED LATERALITY (HCC): ICD-10-CM

## 2019-12-16 DIAGNOSIS — N18.30 CKD STAGE 3 SECONDARY TO DIABETES (HCC): ICD-10-CM

## 2019-12-16 DIAGNOSIS — D64.9 ANEMIA, UNSPECIFIED TYPE: ICD-10-CM

## 2019-12-16 DIAGNOSIS — I10 ESSENTIAL HYPERTENSION: ICD-10-CM

## 2019-12-16 LAB
A/G RATIO: 1.4 (ref 1.1–2.2)
ALBUMIN SERPL-MCNC: 4.2 G/DL (ref 3.4–5)
ALP BLD-CCNC: 66 U/L (ref 40–129)
ALT SERPL-CCNC: 14 U/L (ref 10–40)
ANION GAP SERPL CALCULATED.3IONS-SCNC: 16 MMOL/L (ref 3–16)
AST SERPL-CCNC: 15 U/L (ref 15–37)
BASOPHILS ABSOLUTE: 0.1 K/UL (ref 0–0.2)
BASOPHILS RELATIVE PERCENT: 1.6 %
BILIRUB SERPL-MCNC: 0.5 MG/DL (ref 0–1)
BUN BLDV-MCNC: 22 MG/DL (ref 7–20)
CALCIUM SERPL-MCNC: 9.8 MG/DL (ref 8.3–10.6)
CHLORIDE BLD-SCNC: 102 MMOL/L (ref 99–110)
CHOLESTEROL, TOTAL: 105 MG/DL (ref 0–199)
CO2: 23 MMOL/L (ref 21–32)
CREAT SERPL-MCNC: 1.1 MG/DL (ref 0.8–1.3)
EOSINOPHILS ABSOLUTE: 0.2 K/UL (ref 0–0.6)
EOSINOPHILS RELATIVE PERCENT: 3.6 %
FOLATE: >20 NG/ML (ref 4.78–24.2)
GFR AFRICAN AMERICAN: >60
GFR NON-AFRICAN AMERICAN: >60
GLOBULIN: 3.1 G/DL
GLUCOSE BLD-MCNC: 158 MG/DL (ref 70–99)
HCT VFR BLD CALC: 37.3 % (ref 40.5–52.5)
HDLC SERPL-MCNC: 45 MG/DL (ref 40–60)
HEMOGLOBIN: 12.4 G/DL (ref 13.5–17.5)
LDL CHOLESTEROL CALCULATED: 42 MG/DL
LYMPHOCYTES ABSOLUTE: 1.5 K/UL (ref 1–5.1)
LYMPHOCYTES RELATIVE PERCENT: 25.1 %
MCH RBC QN AUTO: 30.1 PG (ref 26–34)
MCHC RBC AUTO-ENTMCNC: 33.4 G/DL (ref 31–36)
MCV RBC AUTO: 90.3 FL (ref 80–100)
MONOCYTES ABSOLUTE: 0.5 K/UL (ref 0–1.3)
MONOCYTES RELATIVE PERCENT: 8.2 %
NEUTROPHILS ABSOLUTE: 3.7 K/UL (ref 1.7–7.7)
NEUTROPHILS RELATIVE PERCENT: 61.5 %
PDW BLD-RTO: 13.7 % (ref 12.4–15.4)
PHOSPHORUS: 2.7 MG/DL (ref 2.5–4.9)
PLATELET # BLD: 266 K/UL (ref 135–450)
PMV BLD AUTO: 9.3 FL (ref 5–10.5)
POTASSIUM SERPL-SCNC: 4.7 MMOL/L (ref 3.5–5.1)
RBC # BLD: 4.13 M/UL (ref 4.2–5.9)
SODIUM BLD-SCNC: 141 MMOL/L (ref 136–145)
TOTAL PROTEIN: 7.3 G/DL (ref 6.4–8.2)
TRIGL SERPL-MCNC: 88 MG/DL (ref 0–150)
TSH SERPL DL<=0.05 MIU/L-ACNC: 1.29 UIU/ML (ref 0.27–4.2)
VITAMIN B-12: 664 PG/ML (ref 211–911)
VLDLC SERPL CALC-MCNC: 18 MG/DL
WBC # BLD: 6.1 K/UL (ref 4–11)

## 2019-12-17 DIAGNOSIS — D64.9 ANEMIA, UNSPECIFIED TYPE: ICD-10-CM

## 2019-12-17 DIAGNOSIS — N18.30 CKD STAGE 3 SECONDARY TO DIABETES (HCC): ICD-10-CM

## 2019-12-17 DIAGNOSIS — E78.00 PURE HYPERCHOLESTEROLEMIA: ICD-10-CM

## 2019-12-17 DIAGNOSIS — E11.22 CKD STAGE 3 SECONDARY TO DIABETES (HCC): ICD-10-CM

## 2019-12-17 DIAGNOSIS — E11.3299 MILD NONPROLIFERATIVE DIABETIC RETINOPATHY WITHOUT MACULAR EDEMA ASSOCIATED WITH TYPE 2 DIABETES MELLITUS, UNSPECIFIED LATERALITY (HCC): ICD-10-CM

## 2019-12-17 DIAGNOSIS — I10 ESSENTIAL HYPERTENSION: ICD-10-CM

## 2019-12-17 LAB
CREATININE URINE: 97.9 MG/DL (ref 39–259)
ESTIMATED AVERAGE GLUCOSE: 188.6 MG/DL
HBA1C MFR BLD: 8.2 %
MICROALBUMIN UR-MCNC: 20.4 MG/DL
MICROALBUMIN/CREAT UR-RTO: 208.4 MG/G (ref 0–30)

## 2019-12-19 ENCOUNTER — OFFICE VISIT (OUTPATIENT)
Dept: ENDOCRINOLOGY | Age: 74
End: 2019-12-19
Payer: MEDICARE

## 2019-12-19 VITALS
HEIGHT: 72 IN | OXYGEN SATURATION: 98 % | SYSTOLIC BLOOD PRESSURE: 139 MMHG | HEART RATE: 82 BPM | WEIGHT: 215.6 LBS | BODY MASS INDEX: 29.2 KG/M2 | DIASTOLIC BLOOD PRESSURE: 81 MMHG

## 2019-12-19 DIAGNOSIS — N18.30 CKD STAGE 3 SECONDARY TO DIABETES (HCC): ICD-10-CM

## 2019-12-19 DIAGNOSIS — I10 ESSENTIAL HYPERTENSION: ICD-10-CM

## 2019-12-19 DIAGNOSIS — E78.00 PURE HYPERCHOLESTEROLEMIA: ICD-10-CM

## 2019-12-19 DIAGNOSIS — E66.3 OVERWEIGHT (BMI 25.0-29.9): ICD-10-CM

## 2019-12-19 DIAGNOSIS — E11.3299 MILD NONPROLIFERATIVE DIABETIC RETINOPATHY WITHOUT MACULAR EDEMA ASSOCIATED WITH TYPE 2 DIABETES MELLITUS, UNSPECIFIED LATERALITY (HCC): ICD-10-CM

## 2019-12-19 DIAGNOSIS — E11.22 CKD STAGE 3 SECONDARY TO DIABETES (HCC): ICD-10-CM

## 2019-12-19 PROCEDURE — 99214 OFFICE O/P EST MOD 30 MIN: CPT | Performed by: INTERNAL MEDICINE

## 2020-01-08 ENCOUNTER — OFFICE VISIT (OUTPATIENT)
Dept: INTERNAL MEDICINE CLINIC | Age: 75
End: 2020-01-08
Payer: MEDICARE

## 2020-01-08 VITALS
HEIGHT: 72 IN | SYSTOLIC BLOOD PRESSURE: 136 MMHG | DIASTOLIC BLOOD PRESSURE: 78 MMHG | BODY MASS INDEX: 28.71 KG/M2 | TEMPERATURE: 98.6 F | WEIGHT: 212 LBS

## 2020-01-08 PROCEDURE — 99215 OFFICE O/P EST HI 40 MIN: CPT | Performed by: INTERNAL MEDICINE

## 2020-01-08 ASSESSMENT — PATIENT HEALTH QUESTIONNAIRE - PHQ9
SUM OF ALL RESPONSES TO PHQ QUESTIONS 1-9: 0
SUM OF ALL RESPONSES TO PHQ9 QUESTIONS 1 & 2: 0
1. LITTLE INTEREST OR PLEASURE IN DOING THINGS: 0
2. FEELING DOWN, DEPRESSED OR HOPELESS: 0
SUM OF ALL RESPONSES TO PHQ QUESTIONS 1-9: 0

## 2020-01-08 ASSESSMENT — ENCOUNTER SYMPTOMS
CHEST TIGHTNESS: 0
ABDOMINAL PAIN: 0
RESPIRATORY NEGATIVE: 1
SHORTNESS OF BREATH: 0

## 2020-01-08 NOTE — LETTER
[facility] [address]  [address] [address]  Phone: [phone]  Fax: [phone]    Dr. Sheridan Mustafa        January 8, 2020       Patient: Carlos Soto   MR Number: <[id number]>   YOB: 1945   Date of Visit: 1/8/2020       Dear Dr. Garcia Heads: Thank you for the request for consultation for Radha Mathews to me for preoperative evaluation prior to cataract surgery. Below are the relevant portions of my assessment and plan of care. Subjective:      Patient ID: Carlos Soto is a 69 y.o. male. Chief Complaint   Patient presents with    Pre-op Exam     yearly/     In for preop prior to cataract surgery. Otherwise feels well. Diabetes has not been well controlled recently but has been working on diet. Has lost 5#'s and sugars are looking better. Is spilling some protein in the urine and has retinopathy so needs tighter control. BP's sl up today an just got new meter.      Carlos Soto  1945    Allergies   Allergen Reactions    Lisinopril Swelling     Neck swelling w ace     Current Outpatient Medications   Medication Sig Dispense Refill    atorvastatin (LIPITOR) 40 MG tablet Take 1 tablet by mouth daily 90 tablet 3    tadalafil (CIALIS) 10 MG tablet Take 1 tablet by mouth as needed for Erectile Dysfunction 30 tablet 3    amLODIPine (NORVASC) 5 MG tablet Take 1 tablet by mouth daily 90 tablet 3    omeprazole (PRILOSEC) 20 MG delayed release capsule Take 1 capsule by mouth daily 90 capsule 3    pioglitazone (ACTOS) 15 MG tablet Take 1 tablet by mouth daily 90 tablet 1    aspirin 81 MG tablet Take 81 mg by mouth daily      Multiple Vitamins-Minerals (CENTRUM SILVER PO) Take by mouth      cetirizine (ZYRTEC) 10 MG tablet Take 10 mg by mouth daily      Omega-3 Fatty Acids (FISH OIL) 1000 MG CAPS Take 3,000 mg by mouth 3 times daily      insulin glargine (LANTUS SOLOSTAR) 100 UNIT/ML injection pen Inject 28 Units into the skin nightly 15 pen 1    losartan-hydrochlorothiazide (HYZAAR) 100-12.5 MG per tablet Take 1 tablet by mouth daily 90 tablet 1    glipiZIDE-metformin (METAGLIP) 5-500 MG per tablet Take 2 tablets by mouth 2 times daily (before meals) 360 tablet 1    NOVOFINE 32G X 6 MM MISC daily 100 each 5    ONE TOUCH ULTRA TEST strip 1 each by In Vitro route 4 times daily As needed. 400 strip 5     No current facility-administered medications for this visit. Vitals:    01/08/20 1119   BP: 136/78   Temp: 98.6 °F (37 °C)   Weight: 212 lb (96.2 kg)   Height: 6' (1.829 m)     Body mass index is 28.75 kg/m².      Wt Readings from Last 3 Encounters:   01/08/20 212 lb (96.2 kg)   12/19/19 215 lb 9.6 oz (97.8 kg)   12/02/19 215 lb (97.5 kg)     BP Readings from Last 3 Encounters:   01/08/20 136/78   12/19/19 139/81   12/02/19 138/82         Immunization History   Administered Date(s) Administered    Influenza 09/22/2015    Influenza Virus Vaccine 09/25/2013    Influenza Whole 09/17/2009    Influenza, High Dose (Fluzone 65 yrs and older) 08/31/2016, 09/06/2017, 09/30/2019    Pneumococcal Conjugate 13-valent (Phyiuwk87) 07/18/2016    Pneumococcal Polysaccharide (Fuwcromkd01) 09/20/2010, 09/07/2018    Tdap (Boostrix, Adacel) 09/17/2009    Zoster Live (Zostavax) 09/06/2014    Zoster Recombinant (Shingrix) 05/21/2019, 08/16/2019       Past Medical History:   Diagnosis Date    Elevated prostate specific antigen (PSA)     Esophageal reflux     Other and unspecified hyperlipidemia     Screening PSA (prostate specific antigen) 9/17/2009    2.9    Type 2 diabetes mellitus, uncontrolled ([facility]     Dr. 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, not stated as uncontrolled(250.50)     Type II or unspecified type diabetes mellitus without mention of complication, not stated as uncontrolled     Unspecified essential hypertension      Past Surgical History: Procedure Laterality Date    CATARACT REMOVAL WITH IMPLANT      COLONOSCOPY  8/5/2009    Normal-moreno 2012-14    HERNIA REPAIR      TONSILLECTOMY       Family History   Problem Relation Age of Onset    Colon Cancer Father     Hypertension Mother      Social History     Socioeconomic History    Marital status:      Spouse name: Not on file    Number of children: Not on file    Years of education: Not on file    Highest education level: Not on file   Occupational History    Not on file   Social Needs    Financial resource strain: Not on file    Food insecurity:     Worry: Not on file     Inability: Not on file    Transportation needs:     Medical: Not on file     Non-medical: Not on file   Tobacco Use    Smoking status: Never Smoker    Smokeless tobacco: Never Used   Substance and Sexual Activity    Alcohol use: No    Drug use: No    Sexual activity: Yes     Partners: Female   Lifestyle    Physical activity:     Days per week: Not on file     Minutes per session: Not on file    Stress: Not on file   Relationships    Social connections:     Talks on phone: Not on file     Gets together: Not on file     Attends Anglican service: Not on file     Active member of club or organization: Not on file     Attends meetings of clubs or organizations: Not on file     Relationship status: Not on file    Intimate partner violence:     Fear of current or ex partner: Not on file     Emotionally abused: Not on file     Physically abused: Not on file     Forced sexual activity: Not on file   Other Topics Concern    Not on file   Social History Narrative    Not on file             Review of Systems   Constitutional: Negative for appetite change and fatigue. HENT: Negative. Eyes: Positive for visual disturbance. Respiratory: Negative. Negative for chest tightness and shortness of breath. Cardiovascular: Negative for chest pain and palpitations. Gastrointestinal: Negative for abdominal pain.

## 2020-01-08 NOTE — PROGRESS NOTES
resource strain: Not on file    Food insecurity:     Worry: Not on file     Inability: Not on file    Transportation needs:     Medical: Not on file     Non-medical: Not on file   Tobacco Use    Smoking status: Never Smoker    Smokeless tobacco: Never Used   Substance and Sexual Activity    Alcohol use: No    Drug use: No    Sexual activity: Yes     Partners: Female   Lifestyle    Physical activity:     Days per week: Not on file     Minutes per session: Not on file    Stress: Not on file   Relationships    Social connections:     Talks on phone: Not on file     Gets together: Not on file     Attends Caodaism service: Not on file     Active member of club or organization: Not on file     Attends meetings of clubs or organizations: Not on file     Relationship status: Not on file    Intimate partner violence:     Fear of current or ex partner: Not on file     Emotionally abused: Not on file     Physically abused: Not on file     Forced sexual activity: Not on file   Other Topics Concern    Not on file   Social History Narrative    Not on file             Review of Systems   Constitutional: Negative for appetite change and fatigue. HENT: Negative. Eyes: Positive for visual disturbance. Respiratory: Negative. Negative for chest tightness and shortness of breath. Cardiovascular: Negative for chest pain and palpitations. Gastrointestinal: Negative for abdominal pain. Genitourinary: Negative. Skin: Negative. Neurological: Negative for weakness. Psychiatric/Behavioral: Negative for dysphoric mood and sleep disturbance. The patient is not nervous/anxious. Objective:   Physical Exam  Constitutional:       General: He is not in acute distress. Appearance: He is well-developed. HENT:      Head: Normocephalic and atraumatic.       Right Ear: External ear normal.      Left Ear: External ear normal.   Eyes:      Conjunctiva/sclera: Conjunctivae normal.      Pupils: Pupils are

## 2020-01-23 ENCOUNTER — TELEPHONE (OUTPATIENT)
Dept: INTERNAL MEDICINE CLINIC | Age: 75
End: 2020-01-23

## 2020-01-27 ENCOUNTER — TELEPHONE (OUTPATIENT)
Dept: INTERNAL MEDICINE CLINIC | Age: 75
End: 2020-01-27

## 2020-01-27 NOTE — TELEPHONE ENCOUNTER
Please send referral to PT for back to  [facility]  [phone]    Patient has appt for 1/31 and they require the referral prior to appt.

## 2020-02-26 RX ORDER — GLIPIZIDE AND METFORMIN HCL 5; 500 MG/1; MG/1
TABLET, FILM COATED ORAL
Qty: 360 TABLET | Refills: 1 | Status: SHIPPED | OUTPATIENT
Start: 2020-02-26 | End: 2020-02-26 | Stop reason: SDUPTHER

## 2020-02-26 RX ORDER — PIOGLITAZONEHYDROCHLORIDE 15 MG/1
TABLET ORAL
Qty: 90 TABLET | Refills: 1 | Status: SHIPPED | OUTPATIENT
Start: 2020-02-26 | End: 2020-02-26 | Stop reason: SDUPTHER

## 2020-02-26 RX ORDER — PIOGLITAZONEHYDROCHLORIDE 15 MG/1
TABLET ORAL
Qty: 30 TABLET | Refills: 1 | Status: SHIPPED | OUTPATIENT
Start: 2020-02-26 | End: 2020-08-03 | Stop reason: SDUPTHER

## 2020-02-26 RX ORDER — LOSARTAN POTASSIUM AND HYDROCHLOROTHIAZIDE 12.5; 1 MG/1; MG/1
TABLET ORAL
Qty: 30 TABLET | Refills: 1 | Status: SHIPPED | OUTPATIENT
Start: 2020-02-26 | End: 2020-08-03 | Stop reason: SDUPTHER

## 2020-02-26 RX ORDER — LOSARTAN POTASSIUM AND HYDROCHLOROTHIAZIDE 12.5; 1 MG/1; MG/1
TABLET ORAL
Qty: 90 TABLET | Refills: 1 | Status: SHIPPED | OUTPATIENT
Start: 2020-02-26 | End: 2020-02-26

## 2020-02-26 RX ORDER — GLIPIZIDE AND METFORMIN HCL 5; 500 MG/1; MG/1
TABLET, FILM COATED ORAL
Qty: 120 TABLET | Refills: 1 | Status: SHIPPED | OUTPATIENT
Start: 2020-02-26 | End: 2020-08-03 | Stop reason: SDUPTHER

## 2020-02-26 NOTE — TELEPHONE ENCOUNTER
PT called to have new scripts of these meds sent to his [address] [place] [address] in [place] as an urgent request. He stated he has 4 days worth left and can't wait for [facility]

## 2020-03-13 DIAGNOSIS — N18.30 CKD STAGE 3 SECONDARY TO DIABETES (HCC): ICD-10-CM

## 2020-03-13 DIAGNOSIS — E78.00 PURE HYPERCHOLESTEROLEMIA: ICD-10-CM

## 2020-03-13 DIAGNOSIS — E11.22 CKD STAGE 3 SECONDARY TO DIABETES (HCC): ICD-10-CM

## 2020-03-13 LAB
A/G RATIO: 1.8 (ref 1.1–2.2)
ALBUMIN SERPL-MCNC: 4.5 G/DL (ref 3.4–5)
ALP BLD-CCNC: 52 U/L (ref 40–129)
ALT SERPL-CCNC: 12 U/L (ref 10–40)
ANION GAP SERPL CALCULATED.3IONS-SCNC: 14 MMOL/L (ref 3–16)
AST SERPL-CCNC: 16 U/L (ref 15–37)
BILIRUB SERPL-MCNC: 0.5 MG/DL (ref 0–1)
BUN BLDV-MCNC: 28 MG/DL (ref 7–20)
CALCIUM SERPL-MCNC: 10 MG/DL (ref 8.3–10.6)
CHLORIDE BLD-SCNC: 104 MMOL/L (ref 99–110)
CHOLESTEROL, TOTAL: 111 MG/DL (ref 0–199)
CO2: 26 MMOL/L (ref 21–32)
CREAT SERPL-MCNC: 1.2 MG/DL (ref 0.8–1.3)
GFR AFRICAN AMERICAN: >60
GFR NON-AFRICAN AMERICAN: 59
GLOBULIN: 2.5 G/DL
GLUCOSE BLD-MCNC: 59 MG/DL (ref 70–99)
HDLC SERPL-MCNC: 51 MG/DL (ref 40–60)
LDL CHOLESTEROL CALCULATED: 44 MG/DL
POTASSIUM SERPL-SCNC: 4.4 MMOL/L (ref 3.5–5.1)
PROSTATE SPECIFIC ANTIGEN: 10.55 NG/ML (ref 0–4)
SODIUM BLD-SCNC: 144 MMOL/L (ref 136–145)
TOTAL PROTEIN: 7 G/DL (ref 6.4–8.2)
TRIGL SERPL-MCNC: 81 MG/DL (ref 0–150)
VLDLC SERPL CALC-MCNC: 16 MG/DL

## 2020-03-14 LAB
ESTIMATED AVERAGE GLUCOSE: 165.7 MG/DL
HBA1C MFR BLD: 7.4 %

## 2020-03-17 ENCOUNTER — TELEPHONE (OUTPATIENT)
Dept: ENDOCRINOLOGY | Age: 75
End: 2020-03-17

## 2020-03-18 ENCOUNTER — TELEPHONE (OUTPATIENT)
Dept: INTERNAL MEDICINE CLINIC | Age: 75
End: 2020-03-18

## 2020-03-18 NOTE — TELEPHONE ENCOUNTER
Hemoglobin A1c 7.4, improved. Lipid panel normal.  Glucose was low 59, if glucose continues to be low in the mornings, will need to decrease his Lantus. Please notify me if he has a pattern of low glucose in the mornings. Otherwise chemistry panel was good. PSA was high, 10.55. Please notify patient that he should contact his family doctor and prostate doctor if he has one. Very important. I have forwarded results to his doctor.

## 2020-03-18 NOTE — TELEPHONE ENCOUNTER
I called and left him a message but maybe try again? He had an elevated PSA on his labs ordered by endocrinology but he is being followed by urology and just saw them in December so can we just make sure w him that they are aware of the level-he just needs to touch base w them- it was 10.55 so a little higher than the last one we have on file but not sure what his last one at urology was- may have been that high already or not!  I can't see if he had one there in December

## 2020-07-31 RX ORDER — AMLODIPINE BESYLATE 5 MG/1
TABLET ORAL
Qty: 90 TABLET | Refills: 3 | Status: SHIPPED | OUTPATIENT
Start: 2020-07-31 | End: 2021-07-28

## 2020-07-31 RX ORDER — OMEPRAZOLE 20 MG/1
CAPSULE, DELAYED RELEASE ORAL
Qty: 90 CAPSULE | Refills: 3 | Status: SHIPPED | OUTPATIENT
Start: 2020-07-31 | End: 2021-07-28

## 2020-08-03 ENCOUNTER — OFFICE VISIT (OUTPATIENT)
Dept: ENDOCRINOLOGY | Age: 75
End: 2020-08-03
Payer: MEDICARE

## 2020-08-03 VITALS
BODY MASS INDEX: 29.17 KG/M2 | HEIGHT: 72 IN | SYSTOLIC BLOOD PRESSURE: 138 MMHG | DIASTOLIC BLOOD PRESSURE: 74 MMHG | WEIGHT: 215.4 LBS | TEMPERATURE: 97.7 F | OXYGEN SATURATION: 99 % | HEART RATE: 88 BPM

## 2020-08-03 PROCEDURE — 3051F HG A1C>EQUAL 7.0%<8.0%: CPT | Performed by: INTERNAL MEDICINE

## 2020-08-03 PROCEDURE — 99214 OFFICE O/P EST MOD 30 MIN: CPT | Performed by: INTERNAL MEDICINE

## 2020-08-03 RX ORDER — INSULIN GLARGINE 100 [IU]/ML
28 INJECTION, SOLUTION SUBCUTANEOUS NIGHTLY
Qty: 15 PEN | Refills: 1 | Status: SHIPPED | OUTPATIENT
Start: 2020-08-03 | End: 2020-11-05 | Stop reason: SDUPTHER

## 2020-08-03 RX ORDER — LOSARTAN POTASSIUM AND HYDROCHLOROTHIAZIDE 12.5; 1 MG/1; MG/1
TABLET ORAL
Qty: 90 TABLET | Refills: 1 | Status: SHIPPED | OUTPATIENT
Start: 2020-08-03 | End: 2020-11-05 | Stop reason: SDUPTHER

## 2020-08-03 RX ORDER — GLIPIZIDE AND METFORMIN HCL 5; 500 MG/1; MG/1
TABLET, FILM COATED ORAL
Qty: 360 TABLET | Refills: 1 | Status: SHIPPED | OUTPATIENT
Start: 2020-08-03 | End: 2020-11-05 | Stop reason: SDUPTHER

## 2020-08-03 RX ORDER — TAMSULOSIN HYDROCHLORIDE 0.4 MG/1
CAPSULE ORAL
COMMUNITY
Start: 2020-06-18

## 2020-08-03 RX ORDER — PIOGLITAZONEHYDROCHLORIDE 15 MG/1
TABLET ORAL
Qty: 90 TABLET | Refills: 1 | Status: SHIPPED | OUTPATIENT
Start: 2020-08-03 | End: 2020-11-05 | Stop reason: SDUPTHER

## 2020-08-03 NOTE — PROGRESS NOTES
Mayito Anderson is a 70 y.o. male who presents for Type 2 diabetes mellitus. Current symptoms/problems include hyperglycemia and are worsening. 1.  DM type 2, uncontrolled, with neuropathy ([address] [E11.40, E11.65]    Diagnosed with Type 2 diabetes mellitus in 1979.     Comorbid conditions: hyperlipidemia, Neuropathy and Retinopathy    Current diabetic medications include: metaglip 5/500 mg 2 tabs bid, pioglitazone 15 mg qd, Lantus 28 units qhs  Has a lot of stress    Intolerance to diabetes medications: No     Weight trend: stable  Prior visit with dietician: yes  Current diet: on average, 2 meals per day, eats moderately healthy  Current exercise: walks     Current monitoring regimen: home blood tests - 1 times daily  Has brought blood glucose log/meter:  Yes  Home blood sugar records: fasting range:  and postprandial range:    Any episodes of hypoglycemia? No  Hypoglycemia frequency and time(s):    Does patient have Glucagon emergency kit? No  Does patient have rapid acting carbohydrate? Yes  Does patient wear a medic alert bracelet or necklace? No    2. Mild nonproliferative diabetic retinopathy without macular edema associated with type 2 diabetes mellitus, unspecified laterality ([address]  Vision is OK. 3. Pure hypercholesterolemia  No muscle pain. 4. Essential hypertension  No headaches. 5. Overweight (BMI 25.0-29. 9)  Eats moderately healthy. Walks.     6. CKD stage 3 secondary to diabetes [facility])  Has prostate problems, see Urologist.    7. Nephropathy  Has urination problems      Past Medical History:   Diagnosis Date    Elevated prostate specific antigen (PSA)     Esophageal reflux     Other and unspecified hyperlipidemia     Screening PSA (prostate specific antigen) 9/17/2009    2.9    Type 2 diabetes mellitus, uncontrolled ([facility] [address]     Dr. 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, not stated as uncontrolled(250.50)     Type II or unspecified type diabetes mellitus without mention of complication, not stated as uncontrolled     Unspecified essential hypertension       Patient Active Problem List   Diagnosis    Pure hypercholesterolemia    Essential hypertension    Esophageal reflux    Mild nonproliferative diabetic retinopathy without macular edema associated with type 2 diabetes mellitus (HCC)    Elevated prostate specific antigen (PSA)    Proteinuria    Achilles tendinitis of left lower extremity    DM type 2, uncontrolled, with neuropathy ([address]    Overweight (BMI 25.0-29. 9)    CKD stage 3 secondary to diabetes ([facility] [address]    Urinary frequency    Hyponatremia    Uncontrolled type 2 diabetes mellitus with diabetic nephropathy (HCC)    Age-related cataract     Past Surgical History:   Procedure Laterality Date    CATARACT REMOVAL WITH IMPLANT      COLONOSCOPY  8/5/2009    Normal-moreno 2012-14    HERNIA REPAIR      TONSILLECTOMY       Social History     Socioeconomic History    Marital status:      Spouse name: Not on file    Number of children: Not on file    Years of education: Not on file    Highest education level: Not on file   Occupational History    Not on file   Social Needs    Financial resource strain: Not on file    Food insecurity     Worry: Not on file     Inability: Not on file   [employer] needs     Medical: Not on file     Non-medical: Not on file   Tobacco Use    Smoking status: Never Smoker    Smokeless tobacco: Never Used   Substance and Sexual Activity    Alcohol use: No    Drug use: No    Sexual activity: Yes     Partners: Female   Lifestyle    Physical activity     Days per week: Not on file     Minutes per session: Not on file    Stress: Not on file   Relationships    Social connections     Talks on phone: Not on file     Gets together: Not on file     Attends Taoism service: Not on file     Active member of club or organization: Not on file     Attends meetings of clubs or organizations: Not on file     Relationship status: Not on file    Intimate partner violence     Fear of current or ex partner: Not on file     Emotionally abused: Not on file     Physically abused: Not on file     Forced sexual activity: Not on file   Other Topics Concern    Not on file   Social History Narrative    Not on file     Family History   Problem Relation Age of Onset    Colon Cancer Father     Hypertension Mother      Current Outpatient Medications   Medication Sig Dispense Refill    tamsulosin (FLOMAX) 0.4 MG capsule       glipiZIDE-metformin (METAGLIP) 5-500 MG per tablet TAKE 2 TABLETS TWICE A DAY BEFORE MEALS 360 tablet 1    losartan-hydroCHLOROthiazide (HYZAAR) 100-12.5 MG per tablet TAKE 1 TABLET DAILY 90 tablet 1    pioglitazone (ACTOS) 15 MG tablet TAKE 1 TABLET DAILY 90 tablet 1    insulin glargine (LANTUS SOLOSTAR) 100 UNIT/ML injection pen Inject 28 Units into the skin nightly 15 pen 1    amLODIPine (NORVASC) 5 MG tablet TAKE 1 TABLET DAILY 90 tablet 3    omeprazole (PRILOSEC) 20 MG delayed release capsule TAKE 1 CAPSULE DAILY 90 capsule 3    atorvastatin (LIPITOR) 40 MG tablet Take 1 tablet by mouth daily 90 tablet 3    aspirin 81 MG tablet Take 81 mg by mouth daily      Multiple Vitamins-Minerals (CENTRUM SILVER PO) Take by mouth      cetirizine (ZYRTEC) 10 MG tablet Take 10 mg by mouth daily      Omega-3 Fatty Acids (FISH OIL) 1000 MG CAPS Take 1,000 mg by mouth daily       NOVOFINE 32G X 6 MM MISC daily 100 each 5    ONE TOUCH ULTRA TEST strip 1 each by In Vitro route 4 times daily As needed. 400 strip 5     No current facility-administered medications for this visit.       Allergies   Allergen Reactions    Lisinopril Swelling     Neck swelling w ace     Family Status   Relation Name Status    Father   at age 62        Colon CA    Mother   at age 80        born [birth year]-\"old age\"   [facility] Brother  Alive   [facility] Brother  Alive    Sister  Alive    Sister  Alive    Sister  Alive   May [place]          DM       Lab Review:    Lab Results   Component Value Date    WBC 6.1 2019    HGB 12.4 2019    HCT 37.3 2019    MCV 90.3 2019     2019     Lab Results   Component Value Date     2020    K 4.4 2020     2020    CO2 26 2020    BUN 28 2020    CREATININE 1.2 2020    GLUCOSE 59 2020    CALCIUM 10.0 2020    PROT 7.0 2020    PROT 6.9 2010    LABALBU 4.5 2020    BILITOT 0.5 2020    ALKPHOS 52 2020    AST 16 2020    ALT 12 2020    LABGLOM 59 2020    LABGLOM 53 2010    GFRAA >60 2020    AGRATIO 1.8 2020    GLOB 2.5 2020     Lab Results   Component Value Date    TSH 1.29 2019     Lab Results   Component Value Date    LABA1C 7.4 2020     Lab Results   Component Value Date    .7 2020     Lab Results   Component Value Date    CHOL 111 2020     Lab Results   Component Value Date    TRIG 81 2020     Lab Results   Component Value Date    HDL 51 2020     Lab Results   Component Value Date    LDLCALC 44 2020     Lab Results   Component Value Date    LABVLDL 16 2020     Lab Results   Component Value Date    CHOLHDLRATIO 2.9 2010     Lab Results   Component Value Date    LABMICR 20.40 2019     Lab Results   Component Value Date    VITD25 37 2010        Review of Systems:  Constitutional: no fatigue, no fever, no recent weight gain, no recent weight loss, no changes in appetite  Eyes: no eye pain, no change in vision, no eye redness, no eye irritation, no double vision  Ears, nose, throat: no sore throat, no earache, no decrease in hearing, no hoarseness, no dry mouth, has sinus problems, no difficulty swallowing, no neck lumps, no dental problems, no mouth sores, no ringing in ears  Pulmonary: no shortness of breath, no wheezing, no dyspnea on exertion, no cough  Cardiovascular: no chest pain, no lower extremity edema, no orthopnea, no intermittent leg claudication, no palpitations  Gastrointestinal: no abdominal pain, no nausea, no vomiting, no diarrhea, no constipation, no dysphagia, has heartburn, no bloating  Genitourinary: no dysuria, no urinary incontinence, no urinary hesitancy, no urinary frequency, no feelings of urinary urgency, has nocturia  Musculoskeletal: no joint swelling, no joint stiffness, has joint pain, no muscle cramps, no muscle pain  Integument/Breast: no skin rashes, no skin lesions, no itching, no dry skin  Neurological: no numbness, no tingling, no weakness, no confusion, no headaches, no dizziness, no fainting, no tremors, no decrease in memory, no balance problems  Psychiatric: no anxiety, no depression, no insomnia  Hematologic/Lymphatic: no tendency for easy bleeding, no swollen lymph nodes, no tendency for easy bruising  Immunology: has seasonal allergies, no frequent infections, no frequent illnesses  Endocrine: no temperature intolerance    /74 (Site: Left Upper Arm, Position: Sitting, Cuff Size: Medium Adult)   Pulse 88   Temp 97.7 °F (36.5 °C) (Infrared)   Ht 6' (1.829 m)   Wt 215 lb 6.4 oz (97.7 kg)   SpO2 99%   BMI 29.21 kg/m²    Wt Readings from Last 3 Encounters:   08/03/20 215 lb 6.4 oz (97.7 kg)   01/08/20 212 lb (96.2 kg)   12/19/19 215 lb 9.6 oz (97.8 kg)     Body mass index is 29.21 kg/m².       OBJECTIVE:  Constitutional: no acute distress, well appearing and well nourished  Psychiatric: oriented to person, place and time, judgement and insight and normal, recent and remote memory and intact and mood and affect are normal  Skin: skin and subcutaneous tissue is normal without mass, normal turgor  Head and Face: examination of head and face revealed no abnormalities  Eyes: no lid or conjunctival swelling, erythema or discharge, pupils are normal, equal, round, reactive to light  Ears/Nose: external inspection of ears and nose revealed no abnormalities, hearing is grossly normal  Oropharynx/Mouth/Face: lips, tongue and gums are normal with no lesions, the voice quality was normal  Neck: neck is supple and symmetric, with midline trachea and no masses, thyroid is normal  Lymphatics: normal cervical lymph nodes, normal supraclavicular nodes  Pulmonary: no increased work of breathing or signs of respiratory distress, lungs are clear to auscultation  Cardiovascular: normal heart rate and rhythm, normal S1 and S2, no murmurs and pedal pulses and 2+ bilaterally, No edema  Abdomen: abdomen is soft, non-tender with no masses  Musculoskeletal: normal gait and station and exam of the digits and nails are normal  Neurological: normal coordination and normal general cortical function    Visual inspection:  Deformity/amputation: absent  Skin lesions/pre-ulcerative calluses: absent  Edema: right- negative, left- negative    Sensory exam:  Monofilament sensation: normal  (minimum of 5 random plantar locations tested, avoiding callused areas - > 1 area with absence of sensation is + for neuropathy)    Plus at least one of the following:  Pulses: normal  Proprioception: Intact  Vibration (128 Hz): Impaired    ASSESSMENT/PLAN:  1. DM type 2, uncontrolled, with neuropathy (HCC)  Uncontrolled  Lantus 28 units qhs  Suggested prandial insulin, but patient refuses at this time. He will continue more aggressive diet and exercise  Check 4 times daily  HbA1C 8.4-7.7-8.2-7.4  Diet, exercise. Hx of pancreatitis  - Metaglip   - Pioglitazone  - insulin glargine (LANTUS SOLOSTAR) 100 UNIT/ML injection pen; Inject 30 Units into the skin nightly   - Comprehensive Metabolic Panel; Future  - Hemoglobin A1C; Future  - Lipid Panel; Future  - Microalbumin / Creatinine Urine Ratio; Future    2.  Mild nonproliferative diabetic retinopathy without macular edema associated with type 2 diabetes mellitus, unspecified laterality ([facility] [address]  Follow with

## 2020-10-14 RX ORDER — ATORVASTATIN CALCIUM 40 MG/1
TABLET, FILM COATED ORAL
Qty: 90 TABLET | Refills: 3 | Status: SHIPPED | OUTPATIENT
Start: 2020-10-14 | End: 2021-09-07

## 2020-11-02 LAB
A/G RATIO: 1.6 (ref 1.1–2.2)
ALBUMIN SERPL-MCNC: 4.4 G/DL (ref 3.4–5)
ALP BLD-CCNC: 66 U/L (ref 40–129)
ALT SERPL-CCNC: 18 U/L (ref 10–40)
ANION GAP SERPL CALCULATED.3IONS-SCNC: 9 MMOL/L (ref 3–16)
AST SERPL-CCNC: 18 U/L (ref 15–37)
BILIRUB SERPL-MCNC: 0.6 MG/DL (ref 0–1)
BUN BLDV-MCNC: 21 MG/DL (ref 7–20)
CALCIUM SERPL-MCNC: 10.3 MG/DL (ref 8.3–10.6)
CHLORIDE BLD-SCNC: 102 MMOL/L (ref 99–110)
CHOLESTEROL, TOTAL: 120 MG/DL (ref 0–199)
CO2: 28 MMOL/L (ref 21–32)
CREAT SERPL-MCNC: 1.3 MG/DL (ref 0.8–1.3)
CREATININE URINE: 87.3 MG/DL (ref 39–259)
ESTIMATED AVERAGE GLUCOSE: 194.4 MG/DL
GFR AFRICAN AMERICAN: >60
GFR NON-AFRICAN AMERICAN: 54
GLOBULIN: 2.7 G/DL
GLUCOSE BLD-MCNC: 146 MG/DL (ref 70–99)
HBA1C MFR BLD: 8.4 %
HDLC SERPL-MCNC: 55 MG/DL (ref 40–60)
LDL CHOLESTEROL CALCULATED: 44 MG/DL
MICROALBUMIN UR-MCNC: 17.2 MG/DL
MICROALBUMIN/CREAT UR-RTO: 197 MG/G (ref 0–30)
POTASSIUM SERPL-SCNC: 5.3 MMOL/L (ref 3.5–5.1)
SODIUM BLD-SCNC: 139 MMOL/L (ref 136–145)
TOTAL PROTEIN: 7.1 G/DL (ref 6.4–8.2)
TRIGL SERPL-MCNC: 106 MG/DL (ref 0–150)
VLDLC SERPL CALC-MCNC: 21 MG/DL

## 2020-11-05 ENCOUNTER — OFFICE VISIT (OUTPATIENT)
Dept: ENDOCRINOLOGY | Age: 75
End: 2020-11-05
Payer: MEDICARE

## 2020-11-05 VITALS
HEIGHT: 72 IN | OXYGEN SATURATION: 98 % | SYSTOLIC BLOOD PRESSURE: 144 MMHG | HEART RATE: 72 BPM | DIASTOLIC BLOOD PRESSURE: 70 MMHG | BODY MASS INDEX: 29.26 KG/M2 | RESPIRATION RATE: 14 BRPM | WEIGHT: 216 LBS | TEMPERATURE: 97.2 F

## 2020-11-05 PROCEDURE — 3052F HG A1C>EQUAL 8.0%<EQUAL 9.0%: CPT | Performed by: INTERNAL MEDICINE

## 2020-11-05 PROCEDURE — 99214 OFFICE O/P EST MOD 30 MIN: CPT | Performed by: INTERNAL MEDICINE

## 2020-11-05 RX ORDER — GLIPIZIDE AND METFORMIN HCL 5; 500 MG/1; MG/1
TABLET, FILM COATED ORAL
Qty: 360 TABLET | Refills: 1 | Status: SHIPPED | OUTPATIENT
Start: 2020-11-05 | End: 2021-02-11 | Stop reason: SDUPTHER

## 2020-11-05 RX ORDER — PIOGLITAZONEHYDROCHLORIDE 15 MG/1
TABLET ORAL
Qty: 90 TABLET | Refills: 1 | Status: SHIPPED | OUTPATIENT
Start: 2020-11-05 | End: 2021-02-11 | Stop reason: SDUPTHER

## 2020-11-05 RX ORDER — LOSARTAN POTASSIUM AND HYDROCHLOROTHIAZIDE 12.5; 1 MG/1; MG/1
TABLET ORAL
Qty: 90 TABLET | Refills: 1 | Status: SHIPPED | OUTPATIENT
Start: 2020-11-05 | End: 2021-02-11 | Stop reason: SDUPTHER

## 2020-11-05 RX ORDER — INSULIN GLARGINE 100 [IU]/ML
28 INJECTION, SOLUTION SUBCUTANEOUS NIGHTLY
Qty: 15 PEN | Refills: 1 | Status: SHIPPED | OUTPATIENT
Start: 2020-11-05 | End: 2021-05-13 | Stop reason: SDUPTHER

## 2020-11-05 RX ORDER — BLOOD SUGAR DIAGNOSTIC
1 STRIP MISCELLANEOUS 4 TIMES DAILY
Qty: 400 EACH | Refills: 3 | Status: SHIPPED | OUTPATIENT
Start: 2020-11-05 | End: 2020-12-01 | Stop reason: SDUPTHER

## 2020-11-05 RX ORDER — BLOOD SUGAR DIAGNOSTIC
STRIP MISCELLANEOUS
Qty: 100 STRIP | Refills: 6 | Status: CANCELLED | OUTPATIENT
Start: 2020-11-05

## 2020-11-05 NOTE — PROGRESS NOTES
Peewee Pinto is a 70 y.o. male who presents for Type 2 diabetes mellitus. Current symptoms/problems include hyperglycemia and are worsening. 1.  DM type 2, uncontrolled, with neuropathy ([facility] [address] [E11.40, E11.65]    Diagnosed with Type 2 diabetes mellitus in 1979.     Comorbid conditions: hyperlipidemia, Neuropathy and Retinopathy    Current diabetic medications include: metaglip 5/500 mg 2 tabs bid, pioglitazone 15 mg qd, Lantus 28 units qhs  Has a lot of stress    Intolerance to diabetes medications: No     Weight trend: stable  Prior visit with dietician: yes  Current diet: on average, 2 meals per day, eats moderately healthy  Current exercise: walks     Current monitoring regimen: home blood tests - 1 times daily  Has brought blood glucose log/meter:  Yes  Home blood sugar records: fasting range:  and postprandial range:    Any episodes of hypoglycemia? No  Hypoglycemia frequency and time(s):    Does patient have Glucagon emergency kit? No  Does patient have rapid acting carbohydrate? Yes  Does patient wear a medic alert bracelet or necklace? No    2. Mild nonproliferative diabetic retinopathy without macular edema associated with type 2 diabetes mellitus, unspecified laterality ([address]  Vision is OK. 3. Pure hypercholesterolemia  No muscle pain. 4. Essential hypertension  No headaches. 5. Overweight (BMI 25.0-29. 9)  Eats moderately healthy. Walks.     6. CKD stage 3 secondary to diabetes [facility])  Has prostate problems, see Urologist.    7. Nephropathy  Has urination problems      Past Medical History:   Diagnosis Date    Elevated prostate specific antigen (PSA)     Esophageal reflux     Other and unspecified hyperlipidemia     Screening PSA (prostate specific antigen) 9/17/2009    2.9    Type 2 diabetes mellitus, uncontrolled ([facility] [address]     Dr. 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, not stated as uncontrolled(250.50)     Type II or unspecified type diabetes mellitus without mention of complication, not stated as uncontrolled     Unspecified essential hypertension       Patient Active Problem List   Diagnosis    Pure hypercholesterolemia    Essential hypertension    Esophageal reflux    Mild nonproliferative diabetic retinopathy without macular edema associated with type 2 diabetes mellitus (HCC)    Elevated prostate specific antigen (PSA)    Proteinuria    Achilles tendinitis of left lower extremity    DM type 2, uncontrolled, with neuropathy ([address]    Overweight (BMI 25.0-29. 9)    CKD stage 3 secondary to diabetes ([facility] [address]    Urinary frequency    Hyponatremia    Uncontrolled type 2 diabetes mellitus with diabetic nephropathy (HCC)    Age-related cataract     Past Surgical History:   Procedure Laterality Date    CATARACT REMOVAL WITH IMPLANT      COLONOSCOPY  8/5/2009    Normal-moreno 2012-14    HERNIA REPAIR      TONSILLECTOMY       Social History     Socioeconomic History    Marital status:      Spouse name: Not on file    Number of children: Not on file    Years of education: Not on file    Highest education level: Not on file   Occupational History    Not on file   Social Needs    Financial resource strain: Not on file    Food insecurity     Worry: Not on file     Inability: Not on file   [employer] needs     Medical: Not on file     Non-medical: Not on file   Tobacco Use    Smoking status: Never Smoker    Smokeless tobacco: Never Used   Substance and Sexual Activity    Alcohol use: No    Drug use: No    Sexual activity: Yes     Partners: Female   Lifestyle    Physical activity     Days per week: Not on file     Minutes per session: Not on file    Stress: Not on file   Relationships    Social connections     Talks on phone: Not on file     Gets together: Not on file     Attends Voodoo service: Not on file     Active member of club or organization: Not on file     Attends meetings of clubs or organizations: Not on file     Relationship status: Not on file    Intimate partner violence     Fear of current or ex partner: Not on file     Emotionally abused: Not on file     Physically abused: Not on file     Forced sexual activity: Not on file   Other Topics Concern    Not on file   Social History Narrative    Not on file     Family History   Problem Relation Age of Onset    Colon Cancer Father     Hypertension Mother      Current Outpatient Medications   Medication Sig Dispense Refill    atorvastatin (LIPITOR) 40 MG tablet TAKE 1 TABLET DAILY (DISCONTINUE PRAVACHOL) 90 tablet 3    tamsulosin (FLOMAX) 0.4 MG capsule       glipiZIDE-metformin (METAGLIP) 5-500 MG per tablet TAKE 2 TABLETS TWICE A DAY BEFORE MEALS 360 tablet 1    losartan-hydroCHLOROthiazide (HYZAAR) 100-12.5 MG per tablet TAKE 1 TABLET DAILY 90 tablet 1    pioglitazone (ACTOS) 15 MG tablet TAKE 1 TABLET DAILY 90 tablet 1    insulin glargine (LANTUS SOLOSTAR) 100 UNIT/ML injection pen Inject 28 Units into the skin nightly 15 pen 1    amLODIPine (NORVASC) 5 MG tablet TAKE 1 TABLET DAILY 90 tablet 3    omeprazole (PRILOSEC) 20 MG delayed release capsule TAKE 1 CAPSULE DAILY 90 capsule 3    aspirin 81 MG tablet Take 81 mg by mouth daily      Multiple Vitamins-Minerals (CENTRUM SILVER PO) Take by mouth      cetirizine (ZYRTEC) 10 MG tablet Take 10 mg by mouth daily      Omega-3 Fatty Acids (FISH OIL) 1000 MG CAPS Take 1,000 mg by mouth daily       NOVOFINE 32G X 6 MM MISC daily 100 each 5    ONE TOUCH ULTRA TEST strip 1 each by In Vitro route 4 times daily As needed. 400 strip 5     No current facility-administered medications for this visit.       Allergies   Allergen Reactions    Lisinopril Swelling     Neck swelling w ace     Family Status   Relation Name Status    Father   at age 62        Colon CA    Mother   at age 80        born [birth year]-\"old age\"   [employer] Brother  Alive   [employer] Brother  Alive    Sister  Alive    Sister  Alive    Sister  Alive    PAunt          DM       Lab Review:    Lab Results   Component Value Date    WBC 6.1 2019    HGB 12.4 2019    HCT 37.3 2019    MCV 90.3 2019     2019     Lab Results   Component Value Date     2020    K 5.3 2020     2020    CO2 28 2020    BUN 21 2020    CREATININE 1.3 2020    GLUCOSE 146 2020    CALCIUM 10.3 2020    PROT 7.1 2020    PROT 6.9 2010    LABALBU 4.4 2020    BILITOT 0.6 2020    ALKPHOS 66 2020    AST 18 2020    ALT 18 2020    LABGLOM 54 2020    LABGLOM 53 2010    GFRAA >60 2020    AGRATIO 1.6 2020    GLOB 2.7 2020     Lab Results   Component Value Date    TSH 1.29 2019     Lab Results   Component Value Date    LABA1C 8.4 2020     Lab Results   Component Value Date    .4 2020     Lab Results   Component Value Date    CHOL 120 2020     Lab Results   Component Value Date    TRIG 106 2020     Lab Results   Component Value Date    HDL 55 2020     Lab Results   Component Value Date    LDLCALC 44 2020     Lab Results   Component Value Date    LABVLDL 21 2020     Lab Results   Component Value Date    CHOLHDLRATIO 2.9 2010     Lab Results   Component Value Date    LABMICR 17.20 2020     Lab Results   Component Value Date    VITD25 37 2010        Review of Systems:  Constitutional: no fatigue, no fever, no recent weight gain, no recent weight loss, no changes in appetite  Eyes: no eye pain, no change in vision, no eye redness, no eye irritation, no double vision  Ears, nose, throat: no sore throat, no earache, no decrease in hearing, no hoarseness, no dry mouth, has sinus problems, no difficulty swallowing, no neck lumps, no dental problems, no mouth sores, no ringing in ears  Pulmonary: no shortness of breath, no wheezing, no dyspnea on exertion, no cough  Cardiovascular: no chest pain, no lower extremity edema, no orthopnea, no intermittent leg claudication, no palpitations  Gastrointestinal: no abdominal pain, no nausea, no vomiting, no diarrhea, no constipation, no dysphagia, has heartburn, no bloating  Genitourinary: no dysuria, no urinary incontinence, no urinary hesitancy, no urinary frequency, no feelings of urinary urgency, has nocturia  Musculoskeletal: no joint swelling, no joint stiffness, has joint pain, no muscle cramps, no muscle pain  Integument/Breast: no skin rashes, no skin lesions, no itching, no dry skin  Neurological: no numbness, no tingling, no weakness, no confusion, no headaches, no dizziness, no fainting, no tremors, no decrease in memory, no balance problems  Psychiatric: no anxiety, no depression, no insomnia  Hematologic/Lymphatic: no tendency for easy bleeding, no swollen lymph nodes, no tendency for easy bruising  Immunology: has seasonal allergies, no frequent infections, no frequent illnesses  Endocrine: no temperature intolerance    BP (!) 144/70   Pulse 72   Temp 97.2 °F (36.2 °C)   Resp 14   Ht 6' (1.829 m)   Wt 216 lb (98 kg)   SpO2 98%   BMI 29.29 kg/m²    Wt Readings from Last 3 Encounters:   11/05/20 216 lb (98 kg)   08/03/20 215 lb 6.4 oz (97.7 kg)   01/08/20 212 lb (96.2 kg)     Body mass index is 29.29 kg/m².       OBJECTIVE:  Constitutional: no acute distress, well appearing and well nourished  Psychiatric: oriented to person, place and time, judgement and insight and normal, recent and remote memory and intact and mood and affect are normal  Skin: skin and subcutaneous tissue is normal without mass, normal turgor  Head and Face: examination of head and face revealed no abnormalities  Eyes: no lid or conjunctival swelling, erythema or discharge, pupils are normal, equal, round, reactive to light  Ears/Nose: external inspection of ears and nose revealed no abnormalities, hearing is grossly normal  Oropharynx/Mouth/Face: lips, tongue and gums are normal with no lesions, the voice quality was normal  Neck: neck is supple and symmetric, with midline trachea and no masses, thyroid is normal  Lymphatics: normal cervical lymph nodes, normal supraclavicular nodes  Pulmonary: no increased work of breathing or signs of respiratory distress, lungs are clear to auscultation  Cardiovascular: normal heart rate and rhythm, normal S1 and S2, no murmurs and pedal pulses and 2+ bilaterally, No edema  Abdomen: abdomen is soft, non-tender with no masses  Musculoskeletal: normal gait and station and exam of the digits and nails are normal  Neurological: normal coordination and normal general cortical function      ASSESSMENT/PLAN:  1. DM type 2, uncontrolled, with neuropathy (HCC)  Uncontrolled  Worse  Lantus 28 units qhs  Suggested prandial insulin, but patient refuses at this time, refuses to add medications. He will continue more aggressive diet and exercise  Check 4 times daily  HbA1C 8.4-7.7-8.2-7.4-8.4  Diet, exercise. Hx of pancreatitis  - Metaglip   - Pioglitazone  - insulin glargine (LANTUS SOLOSTAR) 100 UNIT/ML injection pen; Inject 30 Units into the skin nightly   - Comprehensive Metabolic Panel; Future  - Hemoglobin A1C; Future  - Lipid Panel; Future  - Microalbumin / Creatinine Urine Ratio; Future    2. Mild nonproliferative diabetic retinopathy without macular edema associated with type 2 diabetes mellitus, unspecified laterality ([facility] [address]  Follow with Ophthalmologist    3. Pure hypercholesterolemia  Continue pravastatin. LDL 70-32-15-46    4. Essential hypertension  Losartan- HCTZ. 5. Overweight (BMI 25.0-29. 9)  Diet, exercise. 6. CKD stage 3 secondary to diabetes [facility])  Follows with Urologist.  On losartan  Creatinine 1.2-1.3-1.1-1.2-1.3  GFR [id number]    7.  Nephropathy  Microalbumin creatinine ratio 197  Continue losartan    Reviewed and/or ordered clinical lab results Yes  Reviewed and/or ordered radiology tests No  Reviewed and/or ordered other diagnostic tests No  Discussed test results with performing physician No  Independently reviewed image, tracing, or specimen No  Made a decision to obtain old records No  Reviewed and summarized old records Yes  Hemoglobin A1c 8.5  Obtained history from other than patient No    Torrey Kelly was counseled regarding symptoms of diabetes diagnosis, course and complications of disease if inadequately treated, side effects of medications, diagnosis, treatment options, and prognosis, risks, benefits, complications, and alternatives of treatment, labs, imaging and other studies and treatment targets and goals. He understands instructions and counseling. No follow-ups on file.

## 2020-12-01 RX ORDER — BLOOD SUGAR DIAGNOSTIC
1 STRIP MISCELLANEOUS 4 TIMES DAILY
Qty: 400 EACH | Refills: 3 | Status: SHIPPED | OUTPATIENT
Start: 2020-12-01 | End: 2022-01-18

## 2021-02-08 DIAGNOSIS — I10 ESSENTIAL HYPERTENSION: ICD-10-CM

## 2021-02-08 DIAGNOSIS — E78.00 PURE HYPERCHOLESTEROLEMIA: ICD-10-CM

## 2021-02-08 LAB
A/G RATIO: 1.5 (ref 1.1–2.2)
ALBUMIN SERPL-MCNC: 4.4 G/DL (ref 3.4–5)
ALP BLD-CCNC: 62 U/L (ref 40–129)
ALT SERPL-CCNC: 17 U/L (ref 10–40)
ANION GAP SERPL CALCULATED.3IONS-SCNC: 12 MMOL/L (ref 3–16)
AST SERPL-CCNC: 18 U/L (ref 15–37)
BILIRUB SERPL-MCNC: 0.5 MG/DL (ref 0–1)
BUN BLDV-MCNC: 26 MG/DL (ref 7–20)
CALCIUM SERPL-MCNC: 10.3 MG/DL (ref 8.3–10.6)
CHLORIDE BLD-SCNC: 100 MMOL/L (ref 99–110)
CHOLESTEROL, TOTAL: 117 MG/DL (ref 0–199)
CO2: 27 MMOL/L (ref 21–32)
CREAT SERPL-MCNC: 1.3 MG/DL (ref 0.8–1.3)
GFR AFRICAN AMERICAN: >60
GFR NON-AFRICAN AMERICAN: 54
GLOBULIN: 3 G/DL
GLUCOSE BLD-MCNC: 95 MG/DL (ref 70–99)
HDLC SERPL-MCNC: 56 MG/DL (ref 40–60)
LDL CHOLESTEROL CALCULATED: 44 MG/DL
POTASSIUM SERPL-SCNC: 5.1 MMOL/L (ref 3.5–5.1)
SODIUM BLD-SCNC: 139 MMOL/L (ref 136–145)
TOTAL PROTEIN: 7.4 G/DL (ref 6.4–8.2)
TRIGL SERPL-MCNC: 86 MG/DL (ref 0–150)
VLDLC SERPL CALC-MCNC: 17 MG/DL

## 2021-02-09 LAB
ESTIMATED AVERAGE GLUCOSE: 191.5 MG/DL
HBA1C MFR BLD: 8.3 %

## 2021-02-11 ENCOUNTER — OFFICE VISIT (OUTPATIENT)
Dept: ENDOCRINOLOGY | Age: 76
End: 2021-02-11
Payer: MEDICARE

## 2021-02-11 VITALS
WEIGHT: 214 LBS | HEIGHT: 72 IN | OXYGEN SATURATION: 96 % | HEART RATE: 89 BPM | RESPIRATION RATE: 14 BRPM | BODY MASS INDEX: 28.99 KG/M2 | SYSTOLIC BLOOD PRESSURE: 136 MMHG | DIASTOLIC BLOOD PRESSURE: 60 MMHG

## 2021-02-11 DIAGNOSIS — I10 ESSENTIAL HYPERTENSION: ICD-10-CM

## 2021-02-11 DIAGNOSIS — N18.30 STAGE 3 CHRONIC KIDNEY DISEASE, UNSPECIFIED WHETHER STAGE 3A OR 3B CKD (HCC): ICD-10-CM

## 2021-02-11 DIAGNOSIS — E11.3293 MILD NONPROLIFERATIVE DIABETIC RETINOPATHY OF BOTH EYES WITHOUT MACULAR EDEMA ASSOCIATED WITH TYPE 2 DIABETES MELLITUS (HCC): ICD-10-CM

## 2021-02-11 DIAGNOSIS — E66.3 OVERWEIGHT (BMI 25.0-29.9): ICD-10-CM

## 2021-02-11 DIAGNOSIS — E78.00 PURE HYPERCHOLESTEROLEMIA: ICD-10-CM

## 2021-02-11 PROCEDURE — 3052F HG A1C>EQUAL 8.0%<EQUAL 9.0%: CPT | Performed by: INTERNAL MEDICINE

## 2021-02-11 PROCEDURE — 99214 OFFICE O/P EST MOD 30 MIN: CPT | Performed by: INTERNAL MEDICINE

## 2021-02-11 RX ORDER — PIOGLITAZONEHYDROCHLORIDE 15 MG/1
TABLET ORAL
Qty: 90 TABLET | Refills: 3 | Status: SHIPPED | OUTPATIENT
Start: 2021-02-11 | End: 2021-12-02 | Stop reason: SDUPTHER

## 2021-02-11 RX ORDER — INSULIN GLARGINE 100 [IU]/ML
28 INJECTION, SOLUTION SUBCUTANEOUS NIGHTLY
Qty: 15 PEN | Refills: 1 | Status: CANCELLED | OUTPATIENT
Start: 2021-02-11

## 2021-02-11 RX ORDER — BLOOD SUGAR DIAGNOSTIC
1 STRIP MISCELLANEOUS 4 TIMES DAILY
Qty: 400 EACH | Refills: 3 | Status: CANCELLED | OUTPATIENT
Start: 2021-02-11

## 2021-02-11 RX ORDER — GLIPIZIDE AND METFORMIN HCL 5; 500 MG/1; MG/1
TABLET, FILM COATED ORAL
Qty: 360 TABLET | Refills: 3 | Status: SHIPPED | OUTPATIENT
Start: 2021-02-11 | End: 2021-12-02 | Stop reason: SDUPTHER

## 2021-02-11 RX ORDER — LOSARTAN POTASSIUM AND HYDROCHLOROTHIAZIDE 12.5; 1 MG/1; MG/1
TABLET ORAL
Qty: 90 TABLET | Refills: 3 | Status: SHIPPED | OUTPATIENT
Start: 2021-02-11 | End: 2021-12-02 | Stop reason: SDUPTHER

## 2021-02-11 NOTE — PROGRESS NOTES
Faye Lisa is a 70 y.o. male who presents for Type 2 diabetes mellitus. Current symptoms/problems include hyperglycemia and are worsening. 1.  DM type 2, uncontrolled, with neuropathy ([facility] [address] [E11.40, E11.65]    Diagnosed with Type 2 diabetes mellitus in 1979.     Comorbid conditions: hyperlipidemia, Neuropathy and Retinopathy    Current diabetic medications include: metaglip 5/500 mg 2 tabs bid, pioglitazone 15 mg qd, Lantus 28 units qhs  Has a lot of stress    Intolerance to diabetes medications: No     Weight trend: stable  Prior visit with dietician: yes  Current diet: on average, 2 meals per day, eats moderately healthy  Current exercise: walks     Current monitoring regimen: home blood tests - 1 times daily  Has brought blood glucose log/meter:  Yes  Home blood sugar records: fasting range:  and postprandial range:    Any episodes of hypoglycemia? No  Hypoglycemia frequency and time(s):    Does patient have Glucagon emergency kit? No  Does patient have rapid acting carbohydrate? Yes  Does patient wear a medic alert bracelet or necklace? No    2. Mild nonproliferative diabetic retinopathy without macular edema associated with type 2 diabetes mellitus, unspecified laterality ([address]  Vision is OK. 3. Pure hypercholesterolemia  No muscle pain. 4. Essential hypertension  No headaches. 5. Overweight (BMI 25.0-29. 9)  Eats moderately healthy. Walks.     6. CKD stage 3 secondary to diabetes [facility])  Has prostate problems, see Urologist.    7. Nephropathy  Has urination problems      Past Medical History:   Diagnosis Date    Elevated prostate specific antigen (PSA)     Esophageal reflux     Other and unspecified hyperlipidemia     Screening PSA (prostate specific antigen) 9/17/2009    2.9    Type 2 diabetes mellitus, uncontrolled ([facility] [address]     Dr. 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, not stated as uncontrolled(250.50)     Type II or unspecified type diabetes mellitus without mention of complication, not stated as uncontrolled     Unspecified essential hypertension       Patient Active Problem List   Diagnosis    Pure hypercholesterolemia    Essential hypertension    Esophageal reflux    Mild nonproliferative diabetic retinopathy without macular edema associated with type 2 diabetes mellitus (HCC)    Elevated prostate specific antigen (PSA)    Proteinuria    Achilles tendinitis of left lower extremity    DM type 2, uncontrolled, with neuropathy ([address]    Overweight (BMI 25.0-29. 9)    Chronic kidney disease, stage 3    Urinary frequency    Hyponatremia    Uncontrolled type 2 diabetes mellitus with diabetic nephropathy (HCC)    Age-related cataract     Past Surgical History:   Procedure Laterality Date    CATARACT REMOVAL WITH IMPLANT      COLONOSCOPY  8/5/2009    Normal-moreno 2012-14    HERNIA REPAIR      TONSILLECTOMY       Social History     Socioeconomic History    Marital status:      Spouse name: Not on file    Number of children: Not on file    Years of education: Not on file    Highest education level: Not on file   Occupational History    Not on file   Social Needs    Financial resource strain: Not on file    Food insecurity     Worry: Not on file     Inability: Not on file   [employer] needs     Medical: Not on file     Non-medical: Not on file   Tobacco Use    Smoking status: Never Smoker    Smokeless tobacco: Never Used   Substance and Sexual Activity    Alcohol use: No    Drug use: No    Sexual activity: Yes     Partners: Female   Lifestyle    Physical activity     Days per week: Not on file     Minutes per session: Not on file    Stress: Not on file   Relationships    Social connections     Talks on phone: Not on file     Gets together: Not on file     Attends Buddhist service: Not on file     Active member of club or organization: Not on file     Attends meetings of clubs or organizations: Not on file     Relationship status: Not on file    Intimate partner violence     Fear of current or ex partner: Not on file     Emotionally abused: Not on file     Physically abused: Not on file     Forced sexual activity: Not on file   Other Topics Concern    Not on file   Social History Narrative    Not on file     Family History   Problem Relation Age of Onset    Colon Cancer Father     Hypertension Mother      Current Outpatient Medications   Medication Sig Dispense Refill    pioglitazone (ACTOS) 15 MG tablet TAKE 1 TABLET DAILY 90 tablet 3    losartan-hydroCHLOROthiazide (HYZAAR) 100-12.5 MG per tablet TAKE 1 TABLET DAILY 90 tablet 3    glipiZIDE-metformin (METAGLIP) 5-500 MG per tablet TAKE 2 TABLETS TWICE A DAY BEFORE MEALS 360 tablet 3    ONETOUCH ULTRA strip 1 each by In Vitro route 4 times daily 400 each 3    insulin glargine (LANTUS SOLOSTAR) 100 UNIT/ML injection pen Inject 28 Units into the skin nightly 15 pen 1    atorvastatin (LIPITOR) 40 MG tablet TAKE 1 TABLET DAILY (DISCONTINUE PRAVACHOL) 90 tablet 3    tamsulosin (FLOMAX) 0.4 MG capsule       amLODIPine (NORVASC) 5 MG tablet TAKE 1 TABLET DAILY 90 tablet 3    omeprazole (PRILOSEC) 20 MG delayed release capsule TAKE 1 CAPSULE DAILY 90 capsule 3    aspirin 81 MG tablet Take 81 mg by mouth daily      Multiple Vitamins-Minerals (CENTRUM SILVER PO) Take by mouth      cetirizine (ZYRTEC) 10 MG tablet Take 10 mg by mouth daily      Omega-3 Fatty Acids (FISH OIL) 1000 MG CAPS Take 1,000 mg by mouth daily       NOVOFINE 32G X 6 MM MISC daily 100 each 5     No current facility-administered medications for this visit.       Allergies   Allergen Reactions    Lisinopril Swelling     Neck swelling w ace     Family Status   Relation Name Status    Father   at age 62        Colon CA    Mother   at age 80        born [birth year]-\"old age\"   [employer] Brother  Alive   [employer] Brother  Alive    Sister  Alive    Sister  Alive    cough  Cardiovascular: no chest pain, no lower extremity edema, no orthopnea, no intermittent leg claudication, no palpitations  Gastrointestinal: no abdominal pain, no nausea, no vomiting, no diarrhea, no constipation, no dysphagia, has heartburn, no bloating  Genitourinary: no dysuria, no urinary incontinence, no urinary hesitancy, no urinary frequency, no feelings of urinary urgency, has nocturia  Musculoskeletal: no joint swelling, no joint stiffness, has joint pain, no muscle cramps, no muscle pain  Integument/Breast: no skin rashes, no skin lesions, no itching, no dry skin  Neurological: no numbness, no tingling, no weakness, no confusion, no headaches, no dizziness, no fainting, no tremors, no decrease in memory, no balance problems  Psychiatric: no anxiety, no depression, no insomnia  Hematologic/Lymphatic: no tendency for easy bleeding, no swollen lymph nodes, no tendency for easy bruising  Immunology: has seasonal allergies, no frequent infections, no frequent illnesses  Endocrine: no temperature intolerance    /60   Pulse 89   Resp 14   Ht 6' (1.829 m)   Wt 214 lb (97.1 kg)   SpO2 96%   BMI 29.02 kg/m²    Wt Readings from Last 3 Encounters:   02/11/21 214 lb (97.1 kg)   11/05/20 216 lb (98 kg)   08/03/20 215 lb 6.4 oz (97.7 kg)     Body mass index is 29.02 kg/m².       OBJECTIVE:  Constitutional: no acute distress, well appearing and well nourished  Psychiatric: oriented to person, place and time, judgement and insight and normal, recent and remote memory and intact and mood and affect are normal  Skin: skin and subcutaneous tissue is normal without mass, normal turgor  Head and Face: examination of head and face revealed no abnormalities  Eyes: no lid or conjunctival swelling, erythema or discharge, pupils are normal, equal, round, reactive to light  Ears/Nose: external inspection of ears and nose revealed no abnormalities, hearing is grossly normal  Oropharynx/Mouth/Face: lips, tongue and Overweight (BMI 25.0-29. 9)  Diet, exercise. 6. CKD stage 3 secondary to diabetes [facility])  Follows with Urologist.  On losartan  Creatinine 1.2-1.3-1.11.21.3  GFR 53-79-183430    7. Nephropathy  Microalbumin creatinine ratio 197  Continue losartan    Reviewed and/or ordered clinical lab results Yes  Reviewed and/or ordered radiology tests No  Reviewed and/or ordered other diagnostic tests No  Discussed test results with performing physician No  Independently reviewed image, tracing, or specimen No  Made a decision to obtain old records No  Reviewed and summarized old records Yes  Hemoglobin A1c 8.5  Obtained history from other than patient No    Brooke Bloom was counseled regarding symptoms of diabetes diagnosis, course and complications of disease if inadequately treated, side effects of medications, diagnosis, treatment options, and prognosis, risks, benefits, complications, and alternatives of treatment, labs, imaging and other studies and treatment targets and goals. He understands instructions and counseling. Return in about 3 months (around 5/11/2021) for diabetes.

## 2021-04-12 ENCOUNTER — TELEPHONE (OUTPATIENT)
Dept: INTERNAL MEDICINE CLINIC | Age: 76
End: 2021-04-12

## 2021-04-12 NOTE — TELEPHONE ENCOUNTER
Patient wife called and scheduled him an AWV for 04/21/21 @ 1:00 pm.  Can you please put lab orders in the system so he have this done before his appointment? Please call to advise.

## 2021-04-14 ENCOUNTER — TELEPHONE (OUTPATIENT)
Dept: INTERNAL MEDICINE CLINIC | Age: 76
End: 2021-04-14

## 2021-04-14 DIAGNOSIS — R97.20 ELEVATED PROSTATE SPECIFIC ANTIGEN (PSA): ICD-10-CM

## 2021-04-14 DIAGNOSIS — R53.83 OTHER FATIGUE: ICD-10-CM

## 2021-04-14 DIAGNOSIS — I10 ESSENTIAL HYPERTENSION: ICD-10-CM

## 2021-04-14 DIAGNOSIS — E78.00 PURE HYPERCHOLESTEROLEMIA: Primary | ICD-10-CM

## 2021-04-19 DIAGNOSIS — E78.00 PURE HYPERCHOLESTEROLEMIA: ICD-10-CM

## 2021-04-19 DIAGNOSIS — I10 ESSENTIAL HYPERTENSION: ICD-10-CM

## 2021-04-19 DIAGNOSIS — R53.83 OTHER FATIGUE: ICD-10-CM

## 2021-04-19 DIAGNOSIS — R97.20 ELEVATED PROSTATE SPECIFIC ANTIGEN (PSA): ICD-10-CM

## 2021-04-19 LAB
A/G RATIO: 1.6 (ref 1.1–2.2)
ALBUMIN SERPL-MCNC: 4.5 G/DL (ref 3.4–5)
ALP BLD-CCNC: 55 U/L (ref 40–129)
ALT SERPL-CCNC: 15 U/L (ref 10–40)
ANION GAP SERPL CALCULATED.3IONS-SCNC: 11 MMOL/L (ref 3–16)
AST SERPL-CCNC: 15 U/L (ref 15–37)
BASOPHILS ABSOLUTE: 0.1 K/UL (ref 0–0.2)
BASOPHILS RELATIVE PERCENT: 1.8 %
BILIRUB SERPL-MCNC: 0.3 MG/DL (ref 0–1)
BUN BLDV-MCNC: 26 MG/DL (ref 7–20)
CALCIUM SERPL-MCNC: 10.1 MG/DL (ref 8.3–10.6)
CHLORIDE BLD-SCNC: 107 MMOL/L (ref 99–110)
CHOLESTEROL, TOTAL: 104 MG/DL (ref 0–199)
CO2: 25 MMOL/L (ref 21–32)
CREAT SERPL-MCNC: 1.3 MG/DL (ref 0.8–1.3)
EOSINOPHILS ABSOLUTE: 0.3 K/UL (ref 0–0.6)
EOSINOPHILS RELATIVE PERCENT: 3.9 %
GFR AFRICAN AMERICAN: >60
GFR NON-AFRICAN AMERICAN: 54
GLOBULIN: 2.9 G/DL
GLUCOSE BLD-MCNC: 100 MG/DL (ref 70–99)
HCT VFR BLD CALC: 34.4 % (ref 40.5–52.5)
HDLC SERPL-MCNC: 41 MG/DL (ref 40–60)
HEMOGLOBIN: 11.6 G/DL (ref 13.5–17.5)
LDL CHOLESTEROL CALCULATED: 46 MG/DL
LYMPHOCYTES ABSOLUTE: 1.6 K/UL (ref 1–5.1)
LYMPHOCYTES RELATIVE PERCENT: 25.4 %
MCH RBC QN AUTO: 30.7 PG (ref 26–34)
MCHC RBC AUTO-ENTMCNC: 33.9 G/DL (ref 31–36)
MCV RBC AUTO: 90.5 FL (ref 80–100)
MONOCYTES ABSOLUTE: 0.6 K/UL (ref 0–1.3)
MONOCYTES RELATIVE PERCENT: 9.2 %
NEUTROPHILS ABSOLUTE: 3.9 K/UL (ref 1.7–7.7)
NEUTROPHILS RELATIVE PERCENT: 59.7 %
PDW BLD-RTO: 13.6 % (ref 12.4–15.4)
PLATELET # BLD: 271 K/UL (ref 135–450)
PMV BLD AUTO: 8.8 FL (ref 5–10.5)
POTASSIUM SERPL-SCNC: 4.7 MMOL/L (ref 3.5–5.1)
PROSTATE SPECIFIC ANTIGEN: 16.56 NG/ML (ref 0–4)
RBC # BLD: 3.8 M/UL (ref 4.2–5.9)
SODIUM BLD-SCNC: 143 MMOL/L (ref 136–145)
TOTAL PROTEIN: 7.4 G/DL (ref 6.4–8.2)
TRIGL SERPL-MCNC: 86 MG/DL (ref 0–150)
TSH SERPL DL<=0.05 MIU/L-ACNC: 0.97 UIU/ML (ref 0.27–4.2)
VLDLC SERPL CALC-MCNC: 17 MG/DL
WBC # BLD: 6.5 K/UL (ref 4–11)

## 2021-04-20 LAB
ESTIMATED AVERAGE GLUCOSE: 211.6 MG/DL
HBA1C MFR BLD: 9 %

## 2021-04-21 ENCOUNTER — OFFICE VISIT (OUTPATIENT)
Dept: INTERNAL MEDICINE CLINIC | Age: 76
End: 2021-04-21
Payer: MEDICARE

## 2021-04-21 VITALS
DIASTOLIC BLOOD PRESSURE: 64 MMHG | SYSTOLIC BLOOD PRESSURE: 136 MMHG | HEART RATE: 87 BPM | WEIGHT: 214.2 LBS | HEIGHT: 72 IN | OXYGEN SATURATION: 98 % | BODY MASS INDEX: 29.01 KG/M2 | TEMPERATURE: 98.2 F

## 2021-04-21 DIAGNOSIS — N18.30 STAGE 3 CHRONIC KIDNEY DISEASE, UNSPECIFIED WHETHER STAGE 3A OR 3B CKD (HCC): ICD-10-CM

## 2021-04-21 DIAGNOSIS — E78.00 PURE HYPERCHOLESTEROLEMIA: ICD-10-CM

## 2021-04-21 DIAGNOSIS — R35.0 URINARY FREQUENCY: Primary | ICD-10-CM

## 2021-04-21 DIAGNOSIS — R97.20 ELEVATED PROSTATE SPECIFIC ANTIGEN (PSA): ICD-10-CM

## 2021-04-21 DIAGNOSIS — Z00.00 ROUTINE GENERAL MEDICAL EXAMINATION AT A HEALTH CARE FACILITY: ICD-10-CM

## 2021-04-21 DIAGNOSIS — E66.3 OVERWEIGHT (BMI 25.0-29.9): ICD-10-CM

## 2021-04-21 DIAGNOSIS — E11.3293 MILD NONPROLIFERATIVE DIABETIC RETINOPATHY OF BOTH EYES WITHOUT MACULAR EDEMA ASSOCIATED WITH TYPE 2 DIABETES MELLITUS (HCC): ICD-10-CM

## 2021-04-21 DIAGNOSIS — I10 ESSENTIAL HYPERTENSION: ICD-10-CM

## 2021-04-21 PROBLEM — E87.1 HYPONATREMIA: Status: RESOLVED | Noted: 2019-09-20 | Resolved: 2021-04-21

## 2021-04-21 PROCEDURE — 99213 OFFICE O/P EST LOW 20 MIN: CPT | Performed by: INTERNAL MEDICINE

## 2021-04-21 PROCEDURE — 3052F HG A1C>EQUAL 8.0%<EQUAL 9.0%: CPT | Performed by: INTERNAL MEDICINE

## 2021-04-21 PROCEDURE — G0439 PPPS, SUBSEQ VISIT: HCPCS | Performed by: INTERNAL MEDICINE

## 2021-04-21 ASSESSMENT — PATIENT HEALTH QUESTIONNAIRE - PHQ9
SUM OF ALL RESPONSES TO PHQ9 QUESTIONS 1 & 2: 0
SUM OF ALL RESPONSES TO PHQ QUESTIONS 1-9: 0
1. LITTLE INTEREST OR PLEASURE IN DOING THINGS: 0

## 2021-04-21 ASSESSMENT — ENCOUNTER SYMPTOMS
RESPIRATORY NEGATIVE: 1
SHORTNESS OF BREATH: 0
CHEST TIGHTNESS: 0
ABDOMINAL PAIN: 0

## 2021-04-21 ASSESSMENT — LIFESTYLE VARIABLES: HOW OFTEN DO YOU HAVE A DRINK CONTAINING ALCOHOL: 0

## 2021-04-21 NOTE — PROGRESS NOTES
 Drug use: No    Sexual activity: Yes     Partners: Female   Lifestyle    Physical activity     Days per week: Not on file     Minutes per session: Not on file    Stress: Not on file   Relationships    Social connections     Talks on phone: Not on file     Gets together: Not on file     Attends Scientologist service: Not on file     Active member of club or organization: Not on file     Attends meetings of clubs or organizations: Not on file     Relationship status: Not on file    Intimate partner violence     Fear of current or ex partner: Not on file     Emotionally abused: Not on file     Physically abused: Not on file     Forced sexual activity: Not on file   Other Topics Concern    Not on file   Social History Narrative    Not on file         Objective:   Physical Exam  Constitutional:       General: He is not in acute distress. Appearance: He is well-developed. HENT:      Head: Normocephalic and atraumatic. Right Ear: External ear normal.      Left Ear: External ear normal.   Eyes:      Conjunctiva/sclera: Conjunctivae normal.      Pupils: Pupils are equal, round, and reactive to light. Neck:      Musculoskeletal: Normal range of motion and neck supple. Thyroid: No thyroid mass or thyromegaly. Vascular: No carotid bruit. Cardiovascular:      Rate and Rhythm: Normal rate and regular rhythm. Pulses: Normal pulses. Heart sounds: Normal heart sounds. No murmur. No gallop. Comments: No carotid bruits noted bilaterally  Pulmonary:      Effort: Pulmonary effort is normal. No respiratory distress. Breath sounds: Normal breath sounds. No wheezing, rhonchi or rales. Abdominal:      General: Bowel sounds are normal.      Palpations: Abdomen is soft. There is no hepatomegaly, splenomegaly or mass. Tenderness: There is no abdominal tenderness. There is no guarding or rebound. Musculoskeletal: Normal range of motion.    Lymphadenopathy:      Cervical: No cervical adenopathy. Upper Body:      Right upper body: No supraclavicular adenopathy. Left upper body: No supraclavicular adenopathy. Skin:     General: Skin is warm and dry. Findings: No rash. Neurological:      Mental Status: He is alert and oriented to person, place, and time. Cranial Nerves: No cranial nerve deficit. Sensory: No sensory deficit. Deep Tendon Reflexes: Reflexes are normal and symmetric. Psychiatric:         Speech: Speech normal.         Behavior: Behavior normal.         Thought Content: Thought content normal.         Judgment: Judgment normal.           Assessment and Plan:      Elevated prostate specific antigen (PSA)   Ref to  -elevated further now     Urinary frequency   Only urinating 3 times a night so no need for surgery at this time     Essential hypertension   Recheck now     Pure hypercholesterolemia   Controlled w current regimen- continue and monitor closely      Overweight (BMI 25.0-29. 9)   Pt agrees to continue to work on calorie and carb restriction and exercise. Pt aware of the medical dangers of being overweight  \    Mild nonproliferative diabetic retinopathy without macular edema associated with type 2 diabetes mellitus ([address]   utd w eye md     Uncontrolled type 2 diabetes mellitus with diabetic nephropathy ([address]   Need to control all risk factors- monitor blood pressure, blood sugars , and lipids carefully and treat aggressively to avoid progression of renal disease        Chronic kidney disease, stage 3   Need to control all risk factors- monitor blood pressure, blood sugars , and lipids carefully and treat aggressively to avoid progression of renal disease           Encounter Diagnoses   Name Primary?  Urinary frequency Yes    Elevated prostate specific antigen (PSA)     Essential hypertension     Pure hypercholesterolemia     Overweight (BMI 25.0-29. 9)     Mild nonproliferative diabetic retinopathy of both eyes without macular edema associated with type 2 diabetes mellitus ([facility] [address]     Uncontrolled type 2 diabetes mellitus with diabetic nephropathy (HCC)     Stage 3 chronic kidney disease, unspecified whether stage 3a or 3b CKD     Routine general medical examination at a health care facility        Orders Placed This Encounter   Procedures   Don Eaton MD, Urology, [facility]     Referral Priority:   Routine     Referral Type:   Eval and Treat     Referral Reason:   Second Opinion     Referred to Provider:   Sylvia Bearden MD     Requested Specialty:   Urology     Number of Visits Requested:   1

## 2021-04-21 NOTE — PROGRESS NOTES
Medicare Annual Wellness Visit  Name: Laurence Emerson Date: 2021   MRN: <[id number]> Sex: Male   Age: 70 y.o. Ethnicity: Non-/Non    : 1945 Race: [place] is here for Medicare AWV (wellness)    Screenings for behavioral, psychosocial and functional/safety risks, and cognitive dysfunction are all negative except as indicated below. These results, as well as other patient data from the [address] [facility] [address] form, are documented in Flowsheets linked to this Encounter. Allergies   Allergen Reactions    Lisinopril Swelling     Neck swelling w ace       Prior to Visit Medications    Medication Sig Taking?  Authorizing Provider   pioglitazone (ACTOS) 15 MG tablet TAKE 1 TABLET DAILY Yes Saintclair Popper, MD   losartan-hydroCHLOROthiazide (HYZAAR) 100-12.5 MG per tablet TAKE 1 TABLET DAILY Yes Saintclair Popper, MD   glipiZIDE-metformin (METAGLIP) 5-500 MG per tablet TAKE 2 TABLETS TWICE A DAY BEFORE MEALS Yes Saintclair Popper, MD   [facility] ULTRA strip 1 each by In Vitro route 4 times daily Yes Saintclair Popper, MD   insulin glargine (LANTUS SOLOSTAR) 100 UNIT/ML injection pen Inject 28 Units into the skin nightly Yes Saintclair Popper, MD   atorvastatin (LIPITOR) 40 MG tablet TAKE 1 TABLET DAILY (DISCONTINUE PRAVACHOL) Yes Onel Viera MD   tamsulosin (FLOMAX) 0.4 MG capsule  Yes Historical Provider, MD   amLODIPine (NORVASC) 5 MG tablet TAKE 1 TABLET DAILY Yes Onel Viera MD   omeprazole (PRILOSEC) 20 MG delayed release capsule TAKE 1 CAPSULE DAILY Yes Onel Viera MD   aspirin 81 MG tablet Take 81 mg by mouth daily Yes Historical Provider, MD   Multiple Vitamins-Minerals (CENTRUM SILVER PO) Take by mouth Yes Historical Provider, MD   cetirizine (ZYRTEC) 10 MG tablet Take 10 mg by mouth daily Yes Historical Provider, MD   Omega-3 Fatty Acids (FISH OIL) 1000 MG CAPS Take 1,000 mg by mouth daily  Yes Historical Provider, MD   NOVOFINE 32G X 6 MM MISC daily Yes Saintclair Popper, MD Past Medical History:   Diagnosis Date    Elevated prostate specific antigen (PSA)     Esophageal reflux     Other and unspecified hyperlipidemia     Screening PSA (prostate specific antigen) 9/17/2009    2.9    Type 2 diabetes mellitus, uncontrolled ([address]     Dr. 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, not stated as uncontrolled(250.50)     Type II or unspecified type diabetes mellitus without mention of complication, not stated as uncontrolled     Unspecified essential hypertension        Past Surgical History:   Procedure Laterality Date    CATARACT REMOVAL WITH IMPLANT      COLONOSCOPY  8/5/2009    Normal-moreno 2012-14    HERNIA REPAIR      TONSILLECTOMY         Family History   Problem Relation Age of Onset    Colon Cancer Father     Hypertension Mother        CareTeam (Including outside providers/suppliers regularly involved in providing care):   Patient Care Team:  Isaias Callejas MD as PCP - General (Internal Medicine)  Isaias Callejas MD as PCP - [facility] Empaneled Provider  Robby Lux MD as Consulting Physician (Ophthalmology)  Jay Tripp as Consulting Physician (Optometry)    Wt Readings from Last 3 Encounters:   04/21/21 214 lb 3.2 oz (97.2 kg)   02/11/21 214 lb (97.1 kg)   11/05/20 216 lb (98 kg)     Vitals:    04/21/21 1311   BP: 136/64   Site: Left Upper Arm   Pulse: 87   Temp: 98.2 °F (36.8 °C)   SpO2: 98%   Weight: 214 lb 3.2 oz (97.2 kg)   Height: 6' (1.829 m)     Body mass index is 29.05 kg/m². Based upon direct observation of the patient, evaluation of cognition reveals recent and remote memory intact. Patient's complete Health Risk Assessment and screening values have been reviewed and are found in Flowsheets. The following problems were reviewed today and where indicated follow up appointments were made and/or referrals ordered.     Positive Risk Factor Screenings with Interventions: vaccine  Completed    Shingles Vaccine  Completed    Pneumococcal 65+ years Vaccine  Completed    COVID-19 Vaccine  Completed    Hepatitis C screen  Completed    Hepatitis A vaccine  Aged Out    Hib vaccine  Aged Out    Meningococcal (ACWY) vaccine  Aged Out     Recommendations for [email or web address] Due: see orders and patient instructions/AVS.  . Recommended screening schedule for the next 5-10 years is provided to the patient in written form: see Patient Instructions/AVS.    There are no diagnoses linked to this encounter.

## 2021-05-11 LAB
A/G RATIO: 1.6 (ref 1.1–2.2)
ALBUMIN SERPL-MCNC: 4.5 G/DL (ref 3.4–5)
ALP BLD-CCNC: 53 U/L (ref 40–129)
ALT SERPL-CCNC: 15 U/L (ref 10–40)
ANION GAP SERPL CALCULATED.3IONS-SCNC: 11 MMOL/L (ref 3–16)
AST SERPL-CCNC: 16 U/L (ref 15–37)
BILIRUB SERPL-MCNC: 0.5 MG/DL (ref 0–1)
BUN BLDV-MCNC: 28 MG/DL (ref 7–20)
CALCIUM SERPL-MCNC: 10.1 MG/DL (ref 8.3–10.6)
CHLORIDE BLD-SCNC: 102 MMOL/L (ref 99–110)
CHOLESTEROL, TOTAL: 124 MG/DL (ref 0–199)
CO2: 28 MMOL/L (ref 21–32)
CREAT SERPL-MCNC: 1.2 MG/DL (ref 0.8–1.3)
CREATININE URINE: 100.9 MG/DL (ref 39–259)
GFR AFRICAN AMERICAN: >60
GFR NON-AFRICAN AMERICAN: 59
GLOBULIN: 2.8 G/DL
GLUCOSE BLD-MCNC: 110 MG/DL (ref 70–99)
HDLC SERPL-MCNC: 51 MG/DL (ref 40–60)
LDL CHOLESTEROL CALCULATED: 49 MG/DL
MICROALBUMIN UR-MCNC: 8.1 MG/DL
MICROALBUMIN/CREAT UR-RTO: 80.3 MG/G (ref 0–30)
POTASSIUM SERPL-SCNC: 4.8 MMOL/L (ref 3.5–5.1)
SODIUM BLD-SCNC: 141 MMOL/L (ref 136–145)
TOTAL PROTEIN: 7.3 G/DL (ref 6.4–8.2)
TRIGL SERPL-MCNC: 119 MG/DL (ref 0–150)
VLDLC SERPL CALC-MCNC: 24 MG/DL

## 2021-05-12 LAB
ESTIMATED AVERAGE GLUCOSE: 220.2 MG/DL
HBA1C MFR BLD: 9.3 %

## 2021-05-13 ENCOUNTER — OFFICE VISIT (OUTPATIENT)
Dept: ENDOCRINOLOGY | Age: 76
End: 2021-05-13
Payer: MEDICARE

## 2021-05-13 VITALS
HEIGHT: 72 IN | DIASTOLIC BLOOD PRESSURE: 68 MMHG | OXYGEN SATURATION: 99 % | BODY MASS INDEX: 29.39 KG/M2 | HEART RATE: 79 BPM | SYSTOLIC BLOOD PRESSURE: 142 MMHG | WEIGHT: 217 LBS

## 2021-05-13 DIAGNOSIS — E66.3 OVERWEIGHT (BMI 25.0-29.9): ICD-10-CM

## 2021-05-13 DIAGNOSIS — E78.00 PURE HYPERCHOLESTEROLEMIA: ICD-10-CM

## 2021-05-13 DIAGNOSIS — N18.30 STAGE 3 CHRONIC KIDNEY DISEASE, UNSPECIFIED WHETHER STAGE 3A OR 3B CKD (HCC): ICD-10-CM

## 2021-05-13 DIAGNOSIS — E11.3293 MILD NONPROLIFERATIVE DIABETIC RETINOPATHY OF BOTH EYES WITHOUT MACULAR EDEMA ASSOCIATED WITH TYPE 2 DIABETES MELLITUS (HCC): ICD-10-CM

## 2021-05-13 DIAGNOSIS — I10 ESSENTIAL HYPERTENSION: ICD-10-CM

## 2021-05-13 PROCEDURE — 99214 OFFICE O/P EST MOD 30 MIN: CPT | Performed by: INTERNAL MEDICINE

## 2021-05-13 RX ORDER — INSULIN GLARGINE 100 [IU]/ML
28 INJECTION, SOLUTION SUBCUTANEOUS NIGHTLY
Qty: 15 PEN | Refills: 1 | Status: SHIPPED | OUTPATIENT
Start: 2021-05-13 | End: 2021-05-21 | Stop reason: SDUPTHER

## 2021-05-13 RX ORDER — DULAGLUTIDE 0.75 MG/.5ML
0.75 INJECTION, SOLUTION SUBCUTANEOUS WEEKLY
Qty: 4 PEN | Refills: 3 | Status: SHIPPED | OUTPATIENT
Start: 2021-05-13 | End: 2022-03-10 | Stop reason: ALTCHOICE

## 2021-05-13 RX ORDER — DULAGLUTIDE 0.75 MG/.5ML
INJECTION, SOLUTION SUBCUTANEOUS
Qty: 2 PEN | Refills: 0 | COMMUNITY
Start: 2021-05-13 | End: 2022-03-10

## 2021-05-13 NOTE — PROGRESS NOTES
Marcos Yee is a 70 y.o. male who presents for Type 2 diabetes mellitus. Current symptoms/problems include hyperglycemia and are worsening. 1.  DM type 2, uncontrolled, with neuropathy ([facility] [address] [E11.40, E11.65]    Diagnosed with Type 2 diabetes mellitus in 1979.     Comorbid conditions: hyperlipidemia, Neuropathy and Retinopathy    Current diabetic medications include: metaglip 5/500 mg 2 tabs bid, pioglitazone 15 mg qd, Lantus 28 units qhs  Has a lot of stress    Intolerance to diabetes medications: No     Weight trend: stable  Prior visit with dietician: yes  Current diet: on average, 2 meals per day, eats moderately healthy  Current exercise: walks     Current monitoring regimen: home blood tests - 1 times daily  Has brought blood glucose log/meter:  Yes  Home blood sugar records: fasting range:  and postprandial range:    Any episodes of hypoglycemia? No  Hypoglycemia frequency and time(s):    Does patient have Glucagon emergency kit? No  Does patient have rapid acting carbohydrate? Yes  Does patient wear a medic alert bracelet or necklace? No    2. Mild nonproliferative diabetic retinopathy without macular edema associated with type 2 diabetes mellitus, unspecified laterality ([address]  Vision is OK. 3. Pure hypercholesterolemia  No muscle pain. 4. Essential hypertension  No headaches. 5. Overweight (BMI 25.0-29. 9)  Eats moderately healthy. Walks.     6. CKD stage 3 secondary to diabetes [facility])  Has prostate problems, see Urologist.    7. Nephropathy  Has urination problems      Past Medical History:   Diagnosis Date    Elevated prostate specific antigen (PSA)     Esophageal reflux     Other and unspecified hyperlipidemia     Screening PSA (prostate specific antigen) 9/17/2009    2.9    Type 2 diabetes mellitus, uncontrolled ([facility] [address]     Dr. 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, not stated as uncontrolled(250.50)     Type II or unspecified type diabetes mellitus without mention of complication, not stated as uncontrolled     Unspecified essential hypertension       Patient Active Problem List   Diagnosis    Pure hypercholesterolemia    Essential hypertension    Esophageal reflux    Mild nonproliferative diabetic retinopathy without macular edema associated with type 2 diabetes mellitus (HCC)    Elevated prostate specific antigen (PSA)    Proteinuria    Achilles tendinitis of left lower extremity    DM type 2, uncontrolled, with neuropathy ([address]    Overweight (BMI 25.0-29. 9)    Chronic kidney disease, stage 3    Urinary frequency    Uncontrolled type 2 diabetes mellitus with diabetic nephropathy (HCC)    Age-related cataract     Past Surgical History:   Procedure Laterality Date    CATARACT REMOVAL WITH IMPLANT      COLONOSCOPY  8/5/2009    Normal-moreno 2012-14    HERNIA REPAIR      TONSILLECTOMY       Social History     Socioeconomic History    Marital status:      Spouse name: Not on file    Number of children: Not on file    Years of education: Not on file    Highest education level: Not on file   Occupational History    Not on file   Social Needs    Financial resource strain: Not on file    Food insecurity     Worry: Not on file     Inability: Not on file   [employer] needs     Medical: Not on file     Non-medical: Not on file   Tobacco Use    Smoking status: Never Smoker    Smokeless tobacco: Never Used   Substance and Sexual Activity    Alcohol use: No    Drug use: No    Sexual activity: Yes     Partners: Female   Lifestyle    Physical activity     Days per week: Not on file     Minutes per session: Not on file    Stress: Not on file   Relationships    Social connections     Talks on phone: Not on file     Gets together: Not on file     Attends Bahai service: Not on file     Active member of club or organization: Not on file     Attends meetings of clubs or organizations: Not on file Relationship status: Not on file    Intimate partner violence     Fear of current or ex partner: Not on file     Emotionally abused: Not on file     Physically abused: Not on file     Forced sexual activity: Not on file   Other Topics Concern    Not on file   Social History Narrative    Not on file     Family History   Problem Relation Age of Onset    Colon Cancer Father     Hypertension Mother      Current Outpatient Medications   Medication Sig Dispense Refill    pioglitazone (ACTOS) 15 MG tablet TAKE 1 TABLET DAILY 90 tablet 3    losartan-hydroCHLOROthiazide (HYZAAR) 100-12.5 MG per tablet TAKE 1 TABLET DAILY 90 tablet 3    glipiZIDE-metformin (METAGLIP) 5-500 MG per tablet TAKE 2 TABLETS TWICE A DAY BEFORE MEALS 360 tablet 3    ONETOUCH ULTRA strip 1 each by In Vitro route 4 times daily 400 each 3    insulin glargine (LANTUS SOLOSTAR) 100 UNIT/ML injection pen Inject 28 Units into the skin nightly 15 pen 1    atorvastatin (LIPITOR) 40 MG tablet TAKE 1 TABLET DAILY (DISCONTINUE PRAVACHOL) 90 tablet 3    tamsulosin (FLOMAX) 0.4 MG capsule       amLODIPine (NORVASC) 5 MG tablet TAKE 1 TABLET DAILY 90 tablet 3    omeprazole (PRILOSEC) 20 MG delayed release capsule TAKE 1 CAPSULE DAILY 90 capsule 3    aspirin 81 MG tablet Take 81 mg by mouth daily      Multiple Vitamins-Minerals (CENTRUM SILVER PO) Take by mouth      cetirizine (ZYRTEC) 10 MG tablet Take 10 mg by mouth daily      Omega-3 Fatty Acids (FISH OIL) 1000 MG CAPS Take 1,000 mg by mouth daily       NOVOFINE 32G X 6 MM MISC daily 100 each 5     No current facility-administered medications for this visit.       Allergies   Allergen Reactions    Lisinopril Swelling     Neck swelling w ace     Family Status   Relation Name Status    Father   at age 62        Colon CA    Mother   at age 80        born [birth year]-\"old age\"   Alirio Layer Brother  Alive    Brother  Alive    Sister  Alive    Sister  Alive    Sister  Alive   [address]         DM       Lab Review:    Lab Results   Component Value Date    WBC 6.5 2021    HGB 11.6 2021    HCT 34.4 2021    MCV 90.5 2021     2021     Lab Results   Component Value Date     2021    K 4.8 2021     2021    CO2 28 2021    BUN 28 2021    CREATININE 1.2 2021    GLUCOSE 110 2021    CALCIUM 10.1 2021    PROT 7.3 2021    PROT 6.9 2010    LABALBU 4.5 2021    BILITOT 0.5 2021    ALKPHOS 53 2021    AST 16 2021    ALT 15 2021    LABGLOM 59 2021    LABGLOM 53 2010    GFRAA >60 2021    AGRATIO 1.6 2021    GLOB 2.8 2021     Lab Results   Component Value Date    TSH 0.97 2021     Lab Results   Component Value Date    LABA1C 9.3 2021     Lab Results   Component Value Date    .2 2021     Lab Results   Component Value Date    CHOL 124 2021     Lab Results   Component Value Date    TRIG 119 2021     Lab Results   Component Value Date    HDL 51 2021     Lab Results   Component Value Date    LDLCALC 49 2021     Lab Results   Component Value Date    LABVLDL 24 2021     Lab Results   Component Value Date    CHOLHDLRATIO 2.9 2010     Lab Results   Component Value Date    LABMICR 8.10 2021     Lab Results   Component Value Date    VITD25 37 2010        Review of Systems:  Constitutional: no fatigue, no fever, no recent weight gain, no recent weight loss, no changes in appetite  Eyes: no eye pain, no change in vision, no eye redness, no eye irritation, no double vision  Ears, nose, throat: no sore throat, no earache, no decrease in hearing, no hoarseness, no dry mouth, has sinus problems, no difficulty swallowing, no neck lumps, no dental problems, no mouth sores, no ringing in ears  Pulmonary: no shortness of breath, no wheezing, no dyspnea on exertion, no cough  Cardiovascular: no the voice quality was normal  Neck: neck is supple and symmetric, with midline trachea and no masses, thyroid is normal  Lymphatics: normal cervical lymph nodes, normal supraclavicular nodes  Pulmonary: no increased work of breathing or signs of respiratory distress, lungs are clear to auscultation  Cardiovascular: normal heart rate and rhythm, normal S1 and S2, no murmurs and pedal pulses and 2+ bilaterally, No edema  Abdomen: abdomen is soft, non-tender with no masses  Musculoskeletal: normal gait and station and exam of the digits and nails are normal  Neurological: normal coordination and normal general cortical function      ASSESSMENT/PLAN:  1. DM type 2, uncontrolled, with neuropathy ([address]  Uncontrolled  Will start Trulicity  Lantus 28 units qhs  Suggested prandial insulin, but patient refuses at this time. He will continue more aggressive diet and exercise  Check 4 times daily  HbA1C 8.4-7.7-8.27.48.4-8.39.3  Diet, exercise. Hx of pancreatitis  - Metaglip   - Pioglitazone  - Comprehensive Metabolic Panel; Future  - Hemoglobin A1C; Future  - Lipid Panel; Future  - Microalbumin / Creatinine Urine Ratio; Future    2. Mild nonproliferative diabetic retinopathy without macular edema associated with type 2 diabetes mellitus, unspecified laterality ([address]  Follow with Ophthalmologist    3. Pure hypercholesterolemia  Continue pravastatin. LDL 38-17-709111    4. Essential hypertension  Losartan- HCTZ. 5. Overweight (BMI 25.0-29. 9)  Diet, exercise. 6. CKD stage 3 secondary to diabetes [facility])  Follows with Urologist.  On losartan  Creatinine 1.2-1.3-1.11.21.31.2  GFR 93-87-16991015    7.  Nephropathy  Microalbumin creatinine ratio 19780.3  Continue losartan    Reviewed and/or ordered clinical lab results Yes  Reviewed and/or ordered radiology tests No  Reviewed and/or ordered other diagnostic tests No  Discussed test results with performing physician No  Independently reviewed image, tracing, or specimen No  Made a decision to obtain old records No  Reviewed and summarized old records Yes  Hemoglobin A1c 8.5  Obtained history from other than patient No    Marcos Yee was counseled regarding symptoms of diabetes diagnosis, course and complications of disease if inadequately treated, side effects of medications, diagnosis, treatment options, and prognosis, risks, benefits, complications, and alternatives of treatment, labs, imaging and other studies and treatment targets and goals. He understands instructions and counseling. No follow-ups on file.

## 2021-05-21 RX ORDER — INSULIN GLARGINE 100 [IU]/ML
28 INJECTION, SOLUTION SUBCUTANEOUS NIGHTLY
Qty: 15 PEN | Refills: 1 | Status: SHIPPED | OUTPATIENT
Start: 2021-05-21 | End: 2021-08-12

## 2021-05-21 NOTE — TELEPHONE ENCOUNTER
Requested Prescriptions     Pending Prescriptions Disp Refills    insulin glargine (LANTUS SOLOSTAR) 100 UNIT/ML injection pen 15 pen 1     Sig: Inject 28 Units into the skin nightly       Last OV 5/13/21  Next OV 8/12/21  Last A1c 4/19/21    Last refill was sent to [facility] and patient needs it sent to [facility] instead.

## 2021-07-28 RX ORDER — AMLODIPINE BESYLATE 5 MG/1
TABLET ORAL
Qty: 90 TABLET | Refills: 3 | Status: SHIPPED | OUTPATIENT
Start: 2021-07-28

## 2021-07-28 RX ORDER — OMEPRAZOLE 20 MG/1
CAPSULE, DELAYED RELEASE ORAL
Qty: 90 CAPSULE | Refills: 3 | Status: SHIPPED | OUTPATIENT
Start: 2021-07-28

## 2021-08-11 ENCOUNTER — TELEPHONE (OUTPATIENT)
Dept: ENDOCRINOLOGY | Age: 76
End: 2021-08-11

## 2021-08-12 ENCOUNTER — OFFICE VISIT (OUTPATIENT)
Dept: ENDOCRINOLOGY | Age: 76
End: 2021-08-12
Payer: MEDICARE

## 2021-08-12 VITALS
TEMPERATURE: 98 F | DIASTOLIC BLOOD PRESSURE: 60 MMHG | OXYGEN SATURATION: 98 % | HEIGHT: 72 IN | HEART RATE: 72 BPM | BODY MASS INDEX: 28.58 KG/M2 | SYSTOLIC BLOOD PRESSURE: 120 MMHG | WEIGHT: 211 LBS | RESPIRATION RATE: 14 BRPM

## 2021-08-12 DIAGNOSIS — E11.3293 MILD NONPROLIFERATIVE DIABETIC RETINOPATHY OF BOTH EYES WITHOUT MACULAR EDEMA ASSOCIATED WITH TYPE 2 DIABETES MELLITUS (HCC): ICD-10-CM

## 2021-08-12 DIAGNOSIS — E66.3 OVERWEIGHT (BMI 25.0-29.9): ICD-10-CM

## 2021-08-12 DIAGNOSIS — I10 ESSENTIAL HYPERTENSION: ICD-10-CM

## 2021-08-12 DIAGNOSIS — N18.30 STAGE 3 CHRONIC KIDNEY DISEASE, UNSPECIFIED WHETHER STAGE 3A OR 3B CKD (HCC): ICD-10-CM

## 2021-08-12 DIAGNOSIS — E78.00 PURE HYPERCHOLESTEROLEMIA: ICD-10-CM

## 2021-08-12 PROCEDURE — 3051F HG A1C>EQUAL 7.0%<8.0%: CPT | Performed by: INTERNAL MEDICINE

## 2021-08-12 PROCEDURE — 99214 OFFICE O/P EST MOD 30 MIN: CPT | Performed by: INTERNAL MEDICINE

## 2021-08-12 RX ORDER — INSULIN GLARGINE 100 [IU]/ML
28 INJECTION, SOLUTION SUBCUTANEOUS NIGHTLY
Qty: 5 PEN | Refills: 3
Start: 2021-08-12 | End: 2022-03-10

## 2021-08-12 NOTE — PROGRESS NOTES
Silvia Rosario is a 78 y.o. male who presents for Type 2 diabetes mellitus. Current symptoms/problems include hyperglycemia and are worsening. 1.  DM type 2, uncontrolled, with neuropathy ([address] [E11.40, E11.65]    Diagnosed with Type 2 diabetes mellitus in 1979.     Comorbid conditions: hyperlipidemia, Neuropathy and Retinopathy    Current diabetic medications include: metaglip 5/500 mg 2 tabs bid, pioglitazone 15 mg qd, Lantus 28 units qhs  Has a lot of stress    Intolerance to diabetes medications: Trulicity     Weight trend: stable  Prior visit with dietician: yes  Current diet: on average, 2 meals per day, eats moderately healthy  Current exercise: walks     Current monitoring regimen: home blood tests - 1 times daily  Has brought blood glucose log/meter:  Yes  Home blood sugar records: fasting range:  and postprandial range:    Any episodes of hypoglycemia? No  Hypoglycemia frequency and time(s):    Does patient have Glucagon emergency kit? No  Does patient have rapid acting carbohydrate? Yes  Does patient wear a medic alert bracelet or necklace? No    2. Mild nonproliferative diabetic retinopathy without macular edema associated with type 2 diabetes mellitus, unspecified laterality ([address]  Vision is OK. 3. Pure hypercholesterolemia  No muscle pain. 4. Essential hypertension  No headaches. 5. Overweight (BMI 25.0-29. 9)  Eats moderately healthy. Walks.     6. CKD stage 3 secondary to diabetes [facility])  Has prostate problems, see Urologist.    7. Nephropathy  Has urination problems      Past Medical History:   Diagnosis Date    Elevated prostate specific antigen (PSA)     Esophageal reflux     Other and unspecified hyperlipidemia     Screening PSA (prostate specific antigen) 9/17/2009    2.9    Type 2 diabetes mellitus, uncontrolled ([address]     Dr. 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, not stated as uncontrolled(250.50)     Type II or unspecified type diabetes mellitus without mention of complication, not stated as uncontrolled     Unspecified essential hypertension       Patient Active Problem List   Diagnosis    Pure hypercholesterolemia    Essential hypertension    Esophageal reflux    Mild nonproliferative diabetic retinopathy without macular edema associated with type 2 diabetes mellitus (HCC)    Elevated prostate specific antigen (PSA)    Proteinuria    Achilles tendinitis of left lower extremity    DM type 2, uncontrolled, with neuropathy ([address]    Overweight (BMI 25.0-29. 9)    Chronic kidney disease, stage 3    Urinary frequency    Uncontrolled type 2 diabetes mellitus with diabetic nephropathy (HCC)    Age-related cataract     Past Surgical History:   Procedure Laterality Date    CATARACT REMOVAL WITH IMPLANT      COLONOSCOPY  8/5/2009    Normal-moreno 2012-14    HERNIA REPAIR      TONSILLECTOMY       Social History     Socioeconomic History    Marital status:      Spouse name: Not on file    Number of children: Not on file    Years of education: Not on file    Highest education level: Not on file   Occupational History    Not on file   Tobacco Use    Smoking status: Never Smoker    Smokeless tobacco: Never Used   Vaping Use    Vaping Use: Never used   Substance and Sexual Activity    Alcohol use: No    Drug use: No    Sexual activity: Yes     Partners: Female   Other Topics Concern    Not on file   Social History Narrative    Not on file     Social Determinants of Health     Financial Resource Strain:     Difficulty of Paying Living Expenses:    Food Insecurity:     Worried About Running Out of Food in the Last Year:     Ran Out of Food in the Last Year:    Transportation Needs:     Lack of Transportation (Medical):      Lack of Transportation (Non-Medical):    Physical Activity:     Days of Exercise per Week:     Minutes of Exercise per Session:    Stress:     Feeling of Stress :    Social Connections:     Frequency of Communication with Friends and Family:     Frequency of Social Gatherings with Friends and Family:     Attends Church Services:     Active Member of Clubs or Organizations:     Attends Club or Organization Meetings:     Marital Status:    Intimate Partner Violence:     Fear of Current or Ex-Partner:     Emotionally Abused:     Physically Abused:     Sexually Abused:      Family History   Problem Relation Age of Onset    Colon Cancer Father     Hypertension Mother      Current Outpatient Medications   Medication Sig Dispense Refill    NOVOFINE 32G X 6 MM MISC daily 100 each 1    insulin glargine (LANTUS SOLOSTAR) 100 UNIT/ML injection pen Inject 28 Units into the skin nightly 5 pen 3    amLODIPine (NORVASC) 5 MG tablet TAKE 1 TABLET DAILY 90 tablet 3    omeprazole (PRILOSEC) 20 MG delayed release capsule TAKE 1 CAPSULE DAILY 90 capsule 3    pioglitazone (ACTOS) 15 MG tablet TAKE 1 TABLET DAILY 90 tablet 3    losartan-hydroCHLOROthiazide (HYZAAR) 100-12.5 MG per tablet TAKE 1 TABLET DAILY 90 tablet 3    glipiZIDE-metformin (METAGLIP) 5-500 MG per tablet TAKE 2 TABLETS TWICE A DAY BEFORE MEALS 360 tablet 3    ONETOUCH ULTRA strip 1 each by In Vitro route 4 times daily 400 each 3    atorvastatin (LIPITOR) 40 MG tablet TAKE 1 TABLET DAILY (DISCONTINUE PRAVACHOL) 90 tablet 3    tamsulosin (FLOMAX) 0.4 MG capsule       aspirin 81 MG tablet Take 81 mg by mouth daily      Multiple Vitamins-Minerals (CENTRUM SILVER PO) Take by mouth      cetirizine (ZYRTEC) 10 MG tablet Take 10 mg by mouth daily      Omega-3 Fatty Acids (FISH OIL) 1000 MG CAPS Take 1,000 mg by mouth daily       Dulaglutide (TRULICITY) 2.72 OJ/2.7SF SOPN Inject 0.75 mg into the skin once a week (Patient not taking: Reported on 8/12/2021) 4 pen 3    Dulaglutide (TRULICITY) 2.79 CF/3.3QD SOPN Lot # [id number] EX 8/12/2022 (Patient not taking: Reported on 8/12/2021) 2 pen 0     No current facility-administered medications for this visit.      Allergies   Allergen Reactions    Lisinopril Swelling     Neck swelling w ace     Family Status   Relation Name Status    Father   at age 62        Colon CA    Mother   at age 80        born 1920-\"old age\"   [facility] Brother  Alive    Brother  Alive    Sister  Alive    Sister  Alive    Sister  Alive    PAunt          DM       Lab Review:    Lab Results   Component Value Date    WBC 6.5 2021    HGB 11.6 2021    HCT 34.4 2021    MCV 90.5 2021     2021     Lab Results   Component Value Date     08/10/2021    K 4.5 08/10/2021    CL 97 08/10/2021    CO2 26 08/10/2021    BUN 32 08/10/2021    CREATININE 1.3 08/10/2021    GLUCOSE 44 08/10/2021    CALCIUM 9.8 08/10/2021    PROT 7.1 08/10/2021    PROT 6.9 2010    LABALBU 4.4 08/10/2021    BILITOT 0.5 08/10/2021    ALKPHOS 53 08/10/2021    AST 19 08/10/2021    ALT 14 08/10/2021    LABGLOM 54 08/10/2021    LABGLOM 53 2010    GFRAA >60 08/10/2021    AGRATIO 1.6 08/10/2021    GLOB 2.7 08/10/2021     Lab Results   Component Value Date    TSH 0.97 2021     Lab Results   Component Value Date    LABA1C 7.8 08/10/2021     Lab Results   Component Value Date    .2 08/10/2021     Lab Results   Component Value Date    CHOL 117 08/10/2021     Lab Results   Component Value Date    TRIG 87 08/10/2021     Lab Results   Component Value Date    HDL 50 08/10/2021     Lab Results   Component Value Date    LDLCALC 50 08/10/2021     Lab Results   Component Value Date    LABVLDL 17 08/10/2021     Lab Results   Component Value Date    CHOLHDLRATIO 2.9 2010     Lab Results   Component Value Date    LABMICR 4.90 08/10/2021     Lab Results   Component Value Date    VITD25 37 2010        Review of Systems:  Constitutional: no fatigue, no fever, no recent weight gain, no recent weight loss, no changes in appetite  Eyes: no eye pain, no change in subcutaneous tissue is normal without mass, normal turgor  Head and Face: examination of head and face revealed no abnormalities  Eyes: no lid or conjunctival swelling, erythema or discharge, pupils are normal, equal, round, reactive to light  Ears/Nose: external inspection of ears and nose revealed no abnormalities, hearing is grossly normal  Oropharynx/Mouth/Face: lips, tongue and gums are normal with no lesions, the voice quality was normal  Neck: neck is supple and symmetric, with midline trachea and no masses, thyroid is normal  Lymphatics: normal cervical lymph nodes, normal supraclavicular nodes  Pulmonary: no increased work of breathing or signs of respiratory distress, lungs are clear to auscultation  Cardiovascular: normal heart rate and rhythm, normal S1 and S2, no murmurs and pedal pulses and 2+ bilaterally, No edema  Abdomen: abdomen is soft, non-tender with no masses  Musculoskeletal: normal gait and station and exam of the digits and nails are normal  Neurological: normal coordination and normal general cortical function      ASSESSMENT/PLAN:  1. DM type 2, uncontrolled, with neuropathy (HCC)  Uncontrolled  Lantus 28 units qhs  Suggested prandial insulin, but patient refuses at this time. He will continue more aggressive diet and exercise  Check 4 times daily  HbA1C 8.4-7.7-8.27.48.4-8.39.37.8  Diet, exercise. Hx of pancreatitis  - Metaglip   - Pioglitazone  - Comprehensive Metabolic Panel; Future  - Hemoglobin A1C; Future  - Lipid Panel; Future  - Microalbumin / Creatinine Urine Ratio; Future    2. Mild nonproliferative diabetic retinopathy without macular edema associated with type 2 diabetes mellitus, unspecified laterality ([facility] [address]  Follow with Ophthalmologist    3. Pure hypercholesterolemia  Continue pravastatin. LDL 86-34-36511617    4. Essential hypertension  Losartan- HCTZ. 5. Overweight (BMI 25.0-29. 9)  Diet, exercise.     6. CKD stage 3 secondary to diabetes [facility])  Follows with

## 2021-09-07 RX ORDER — ATORVASTATIN CALCIUM 40 MG/1
TABLET, FILM COATED ORAL
Qty: 90 TABLET | Refills: 0 | Status: SHIPPED | OUTPATIENT
Start: 2021-09-07 | End: 2021-12-06 | Stop reason: SDUPTHER

## 2021-11-16 DIAGNOSIS — E78.00 PURE HYPERCHOLESTEROLEMIA: ICD-10-CM

## 2021-11-16 DIAGNOSIS — I10 ESSENTIAL HYPERTENSION: ICD-10-CM

## 2021-11-16 LAB
A/G RATIO: 1.7 (ref 1.1–2.2)
ALBUMIN SERPL-MCNC: 4.5 G/DL (ref 3.4–5)
ALP BLD-CCNC: 62 U/L (ref 40–129)
ALT SERPL-CCNC: 15 U/L (ref 10–40)
ANION GAP SERPL CALCULATED.3IONS-SCNC: 13 MMOL/L (ref 3–16)
AST SERPL-CCNC: 15 U/L (ref 15–37)
BILIRUB SERPL-MCNC: 0.5 MG/DL (ref 0–1)
BUN BLDV-MCNC: 31 MG/DL (ref 7–20)
CALCIUM SERPL-MCNC: 10.1 MG/DL (ref 8.3–10.6)
CHLORIDE BLD-SCNC: 101 MMOL/L (ref 99–110)
CHOLESTEROL, TOTAL: 115 MG/DL (ref 0–199)
CO2: 25 MMOL/L (ref 21–32)
CREAT SERPL-MCNC: 1.2 MG/DL (ref 0.8–1.3)
CREATININE URINE: 74.5 MG/DL (ref 39–259)
GFR AFRICAN AMERICAN: >60
GFR NON-AFRICAN AMERICAN: 59
GLUCOSE BLD-MCNC: 119 MG/DL (ref 70–99)
HDLC SERPL-MCNC: 51 MG/DL (ref 40–60)
LDL CHOLESTEROL CALCULATED: 46 MG/DL
MICROALBUMIN UR-MCNC: 4.9 MG/DL
MICROALBUMIN/CREAT UR-RTO: 65.8 MG/G (ref 0–30)
POTASSIUM SERPL-SCNC: 5 MMOL/L (ref 3.5–5.1)
SODIUM BLD-SCNC: 139 MMOL/L (ref 136–145)
TOTAL PROTEIN: 7.1 G/DL (ref 6.4–8.2)
TRIGL SERPL-MCNC: 89 MG/DL (ref 0–150)
VLDLC SERPL CALC-MCNC: 18 MG/DL

## 2021-11-17 LAB
ESTIMATED AVERAGE GLUCOSE: 185.8 MG/DL
HBA1C MFR BLD: 8.1 %

## 2021-12-02 ENCOUNTER — OFFICE VISIT (OUTPATIENT)
Dept: ENDOCRINOLOGY | Age: 76
End: 2021-12-02
Payer: MEDICARE

## 2021-12-02 VITALS
HEIGHT: 72 IN | DIASTOLIC BLOOD PRESSURE: 70 MMHG | OXYGEN SATURATION: 99 % | WEIGHT: 213 LBS | HEART RATE: 66 BPM | SYSTOLIC BLOOD PRESSURE: 138 MMHG | TEMPERATURE: 98 F | BODY MASS INDEX: 28.85 KG/M2 | RESPIRATION RATE: 14 BRPM

## 2021-12-02 DIAGNOSIS — E66.3 OVERWEIGHT (BMI 25.0-29.9): ICD-10-CM

## 2021-12-02 DIAGNOSIS — I10 ESSENTIAL HYPERTENSION: ICD-10-CM

## 2021-12-02 DIAGNOSIS — E78.00 PURE HYPERCHOLESTEROLEMIA: ICD-10-CM

## 2021-12-02 DIAGNOSIS — E11.3293 MILD NONPROLIFERATIVE DIABETIC RETINOPATHY OF BOTH EYES WITHOUT MACULAR EDEMA ASSOCIATED WITH TYPE 2 DIABETES MELLITUS (HCC): ICD-10-CM

## 2021-12-02 DIAGNOSIS — N18.30 STAGE 3 CHRONIC KIDNEY DISEASE, UNSPECIFIED WHETHER STAGE 3A OR 3B CKD (HCC): ICD-10-CM

## 2021-12-02 PROCEDURE — 3052F HG A1C>EQUAL 8.0%<EQUAL 9.0%: CPT | Performed by: INTERNAL MEDICINE

## 2021-12-02 PROCEDURE — 99214 OFFICE O/P EST MOD 30 MIN: CPT | Performed by: INTERNAL MEDICINE

## 2021-12-02 RX ORDER — LOSARTAN POTASSIUM AND HYDROCHLOROTHIAZIDE 12.5; 1 MG/1; MG/1
TABLET ORAL
Qty: 90 TABLET | Refills: 3 | Status: SHIPPED | OUTPATIENT
Start: 2021-12-02 | End: 2022-06-23 | Stop reason: SDUPTHER

## 2021-12-02 RX ORDER — GLIPIZIDE AND METFORMIN HCL 5; 500 MG/1; MG/1
TABLET, FILM COATED ORAL
Qty: 360 TABLET | Refills: 3 | Status: SHIPPED | OUTPATIENT
Start: 2021-12-02 | End: 2022-06-23 | Stop reason: SDUPTHER

## 2021-12-02 RX ORDER — PIOGLITAZONEHYDROCHLORIDE 15 MG/1
TABLET ORAL
Qty: 90 TABLET | Refills: 3 | Status: SHIPPED | OUTPATIENT
Start: 2021-12-02 | End: 2022-06-23 | Stop reason: SDUPTHER

## 2021-12-02 NOTE — PROGRESS NOTES
Sandoval Mitchell is a 78 y.o. male who presents for Type 2 diabetes mellitus. Current symptoms/problems include hyperglycemia and are worsening. 1.  DM type 2, uncontrolled, with neuropathy ([facility] [address] [E11.40, E11.65]    Diagnosed with Type 2 diabetes mellitus in 1979.     Comorbid conditions: hyperlipidemia, Neuropathy and Retinopathy    Current diabetic medications include: metaglip 5/500 mg 2 tabs bid, pioglitazone 15 mg qd, Lantus 30 units qhs  Has a lot of stress    Intolerance to diabetes medications: Trulicity     Weight trend: stable  Prior visit with dietician: yes  Current diet: on average, 2 meals per day, eats moderately healthy  Current exercise: walks     Current monitoring regimen: home blood tests - 1 times daily  Has brought blood glucose log/meter:  Yes  Home blood sugar records: fasting range:  and postprandial range:    Any episodes of hypoglycemia? No  Hypoglycemia frequency and time(s):    Does patient have Glucagon emergency kit? No  Does patient have rapid acting carbohydrate? Yes  Does patient wear a medic alert bracelet or necklace? No    2. Mild nonproliferative diabetic retinopathy without macular edema associated with type 2 diabetes mellitus, unspecified laterality ([address]  Vision is OK. 3. Pure hypercholesterolemia  No muscle pain. 4. Essential hypertension  No headaches. 5. Overweight (BMI 25.0-29. 9)  Eats moderately healthy. Walks.     6. CKD stage 3 secondary to diabetes [facility])  Has prostate problems, see Urologist.    7. Nephropathy  Has urination problems      Past Medical History:   Diagnosis Date    Elevated prostate specific antigen (PSA)     Esophageal reflux     Other and unspecified hyperlipidemia     Screening PSA (prostate specific antigen) 9/17/2009    2.9    Type 2 diabetes mellitus, uncontrolled ([address]     Dr. 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, not stated as uncontrolled(250.50)     Type II or unspecified type diabetes mellitus without mention of complication, not stated as uncontrolled     Unspecified essential hypertension       Patient Active Problem List   Diagnosis    Pure hypercholesterolemia    Essential hypertension    Esophageal reflux    Mild nonproliferative diabetic retinopathy without macular edema associated with type 2 diabetes mellitus (HCC)    Elevated prostate specific antigen (PSA)    Proteinuria    Achilles tendinitis of left lower extremity    DM type 2, uncontrolled, with neuropathy ([address]    Overweight (BMI 25.0-29. 9)    Chronic kidney disease, stage 3    Urinary frequency    Uncontrolled type 2 diabetes mellitus with diabetic nephropathy (HCC)    Age-related cataract     Past Surgical History:   Procedure Laterality Date    CATARACT REMOVAL WITH IMPLANT      COLONOSCOPY  8/5/2009    Normal-moreno 2012-14    HERNIA REPAIR      TONSILLECTOMY       Social History     Socioeconomic History    Marital status:      Spouse name: Not on file    Number of children: Not on file    Years of education: Not on file    Highest education level: Not on file   Occupational History    Not on file   Tobacco Use    Smoking status: Never Smoker    Smokeless tobacco: Never Used   Vaping Use    Vaping Use: Never used   Substance and Sexual Activity    Alcohol use: No    Drug use: No    Sexual activity: Yes     Partners: Female   Other Topics Concern    Not on file   Social History Narrative    Not on file     Social Determinants of Health     Financial Resource Strain:     Difficulty of Paying Living Expenses: Not on file   Food Insecurity:     Worried About Running Out of Food in the Last Year: Not on file    [place] of Food in the Last Year: Not on file   Transportation Needs:     Lack of Transportation (Medical): Not on file    Lack of Transportation (Non-Medical):  Not on file   Physical Activity:     Days of Exercise per Week: Not on file    Minutes of Exercise per Session: Not on file   Stress:     Feeling of Stress : Not on file   Social Connections:     Frequency of Communication with Friends and Family: Not on file    Frequency of Social Gatherings with Friends and Family: Not on file    Attends Sabianist Services: Not on file    Active Member of [employer] Group or Organizations: Not on file    Attends Club or Organization Meetings: Not on file    Marital Status: Not on file   Intimate Partner Violence:     Fear of Current or Ex-Partner: Not on file    Emotionally Abused: Not on file    Physically Abused: Not on file    Sexually Abused: Not on file   Housing Stability:     Unable to Pay for Housing in the Last Year: Not on file    Number of [place] in the Last Year: Not on file    Unstable Housing in the Last Year: Not on file     Family History   Problem Relation Age of Onset    Colon Cancer Father     Hypertension Mother      Current Outpatient Medications   Medication Sig Dispense Refill    glipiZIDE-metFORMIN (METAGLIP) 5-500 MG per tablet TAKE 2 TABLETS TWICE A DAY BEFORE MEALS 360 tablet 3    losartan-hydroCHLOROthiazide (HYZAAR) 100-12.5 MG per tablet TAKE 1 TABLET DAILY 90 tablet 3    pioglitazone (ACTOS) 15 MG tablet TAKE 1 TABLET DAILY 90 tablet 3    atorvastatin (LIPITOR) 40 MG tablet TAKE 1 TABLET DAILY (DISCONTINUE PRAVACHOL) 90 tablet 0    NOVOFINE 32G X 6 MM MISC daily 100 each 1    insulin glargine (LANTUS SOLOSTAR) 100 UNIT/ML injection pen Inject 28 Units into the skin nightly 5 pen 3    amLODIPine (NORVASC) 5 MG tablet TAKE 1 TABLET DAILY 90 tablet 3    omeprazole (PRILOSEC) 20 MG delayed release capsule TAKE 1 CAPSULE DAILY 90 capsule 3    ONETOUCH ULTRA strip 1 each by In Vitro route 4 times daily 400 each 3    tamsulosin (FLOMAX) 0.4 MG capsule       aspirin 81 MG tablet Take 81 mg by mouth daily      Multiple Vitamins-Minerals (CENTRUM SILVER PO) Take by mouth      cetirizine (ZYRTEC) 10 MG tablet Take 10 mg by mouth daily      Omega-3 Fatty Acids (FISH OIL) 1000 MG CAPS Take 1,000 mg by mouth daily       Dulaglutide (TRULICITY) 1.28 KX/2.8EI SOPN Inject 0.75 mg into the skin once a week (Patient not taking: Reported on 2021) 4 pen 3    Dulaglutide (TRULICITY) 2.82 AT/7.6LV SOPN Lot # [id number] 2022 (Patient not taking: Reported on 2021) 2 pen 0     No current facility-administered medications for this visit.      Allergies   Allergen Reactions    Lisinopril Swelling     Neck swelling w ace     Family Status   Relation Name Status    Father   at age 62        Colon CA    Mother   at age 80        born 1920-\"old age\"   [place] Brother  Alive   [place] Brother  Alive    Sister  Alive    Sister  Alive    Sister  Alive    PAunt          DM       Lab Review:    Lab Results   Component Value Date    WBC 6.5 2021    HGB 11.6 2021    HCT 34.4 2021    MCV 90.5 2021     2021     Lab Results   Component Value Date     2021    K 5.0 2021     2021    CO2 25 2021    BUN 31 2021    CREATININE 1.2 2021    GLUCOSE 119 2021    CALCIUM 10.1 2021    PROT 7.1 2021    PROT 6.9 2010    LABALBU 4.5 2021    BILITOT 0.5 2021    ALKPHOS 62 2021    AST 15 2021    ALT 15 2021    LABGLOM 59 2021    LABGLOM 53 2010    GFRAA >60 2021    AGRATIO 1.7 2021    GLOB 2.7 08/10/2021     Lab Results   Component Value Date    TSH 0.97 2021     Lab Results   Component Value Date    LABA1C 8.1 2021     Lab Results   Component Value Date    .8 2021     Lab Results   Component Value Date    CHOL 115 2021     Lab Results   Component Value Date    TRIG 89 2021     Lab Results   Component Value Date    HDL 51 2021     Lab Results   Component Value Date    LDLCALC 46 2021     Lab Results   Component Value Date    LABVLDL 18 11/16/2021     Lab Results   Component Value Date    CHOLHDLRATIO 2.9 11/08/2010     Lab Results   Component Value Date    LABMICR 4.90 11/16/2021     Lab Results   Component Value Date    VITD25 37 11/08/2010        Review of Systems:  Constitutional: no fatigue, no fever, no recent weight gain, no recent weight loss, no changes in appetite  Eyes: no eye pain, no change in vision, no eye redness, no eye irritation, no double vision  Ears, nose, throat: no sore throat, no earache, no decrease in hearing, no hoarseness, no dry mouth, has sinus problems, no difficulty swallowing, no neck lumps, no dental problems, no mouth sores, no ringing in ears  Pulmonary: no shortness of breath, no wheezing, no dyspnea on exertion, no cough  Cardiovascular: no chest pain, no lower extremity edema, no orthopnea, no intermittent leg claudication, no palpitations  Gastrointestinal: no abdominal pain, no nausea, no vomiting, no diarrhea, no constipation, no dysphagia, has heartburn, no bloating  Genitourinary: no dysuria, no urinary incontinence, no urinary hesitancy, no urinary frequency, no feelings of urinary urgency, has nocturia  Musculoskeletal: no joint swelling, no joint stiffness, has joint pain, no muscle cramps, no muscle pain  Integument/Breast: no skin rashes, no skin lesions, no itching, no dry skin  Neurological: no numbness, no tingling, no weakness, no confusion, no headaches, no dizziness, no fainting, no tremors, no decrease in memory, no balance problems  Psychiatric: no anxiety, no depression, no insomnia  Hematologic/Lymphatic: no tendency for easy bleeding, no swollen lymph nodes, no tendency for easy bruising  Immunology: has seasonal allergies, no frequent infections, no frequent illnesses  Endocrine: no temperature intolerance    /70   Pulse 66   Temp 98 °F (36.7 °C)   Resp 14   Ht 6' (1.829 m)   Wt 213 lb (96.6 kg)   SpO2 99%   BMI 28.89 kg/m²    Wt Readings from Last 3 Encounters:   12/02/21 213 lb (96.6 kg)   08/12/21 211 lb (95.7 kg)   05/13/21 217 lb (98.4 kg)     Body mass index is 28.89 kg/m². OBJECTIVE:  Constitutional: no acute distress, well appearing and well nourished  Psychiatric: oriented to person, place and time, judgement and insight and normal, recent and remote memory and intact and mood and affect are normal  Skin: skin and subcutaneous tissue is normal without mass, normal turgor  Head and Face: examination of head and face revealed no abnormalities  Eyes: no lid or conjunctival swelling, erythema or discharge, pupils are normal, equal, round, reactive to light  Ears/Nose: external inspection of ears and nose revealed no abnormalities, hearing is grossly normal  Oropharynx/Mouth/Face: lips, tongue and gums are normal with no lesions, the voice quality was normal  Neck: neck is supple and symmetric, with midline trachea and no masses, thyroid is normal  Lymphatics: normal cervical lymph nodes, normal supraclavicular nodes  Pulmonary: no increased work of breathing or signs of respiratory distress, lungs are clear to auscultation  Cardiovascular: normal heart rate and rhythm, normal S1 and S2, no murmurs and pedal pulses and 2+ bilaterally, No edema  Abdomen: abdomen is soft, non-tender with no masses  Musculoskeletal: normal gait and station and exam of the digits and nails are normal  Neurological: normal coordination and normal general cortical function      ASSESSMENT/PLAN:  1. DM type 2, uncontrolled, with neuropathy (HCC)  Lantus 30 units qhs  Suggested prandial insulin, but patient refuses at this time. He will continue more aggressive diet and exercise  Check 4 times daily  HbA1C 8.4-7.7-8.27.48.4-8.39.37.88.1  Consider Jardiance/Farxiga  Diet, exercise. Hx of pancreatitis  - Metaglip   - Pioglitazone  - Comprehensive Metabolic Panel; Future  - Hemoglobin A1C; Future  - Lipid Panel; Future  - Microalbumin / Creatinine Urine Ratio; Future    2.  Mild nonproliferative

## 2021-12-06 RX ORDER — ATORVASTATIN CALCIUM 40 MG/1
TABLET, FILM COATED ORAL
Qty: 90 TABLET | Refills: 3 | Status: SHIPPED | OUTPATIENT
Start: 2021-12-06 | End: 2022-06-23

## 2022-01-18 RX ORDER — BLOOD SUGAR DIAGNOSTIC
STRIP MISCELLANEOUS
Qty: 100 STRIP | Refills: 3 | Status: SHIPPED | OUTPATIENT
Start: 2022-01-18 | End: 2022-09-29 | Stop reason: SDUPTHER

## 2022-03-08 DIAGNOSIS — E78.00 PURE HYPERCHOLESTEROLEMIA: ICD-10-CM

## 2022-03-08 DIAGNOSIS — E11.3293 MILD NONPROLIFERATIVE DIABETIC RETINOPATHY OF BOTH EYES WITHOUT MACULAR EDEMA ASSOCIATED WITH TYPE 2 DIABETES MELLITUS (HCC): ICD-10-CM

## 2022-03-08 DIAGNOSIS — N18.30 STAGE 3 CHRONIC KIDNEY DISEASE, UNSPECIFIED WHETHER STAGE 3A OR 3B CKD (HCC): ICD-10-CM

## 2022-03-08 LAB
A/G RATIO: 1.7 (ref 1.1–2.2)
ALBUMIN SERPL-MCNC: 4.5 G/DL (ref 3.4–5)
ALP BLD-CCNC: 59 U/L (ref 40–129)
ALT SERPL-CCNC: 20 U/L (ref 10–40)
ANION GAP SERPL CALCULATED.3IONS-SCNC: 16 MMOL/L (ref 3–16)
AST SERPL-CCNC: 19 U/L (ref 15–37)
BILIRUB SERPL-MCNC: 0.3 MG/DL (ref 0–1)
BUN BLDV-MCNC: 26 MG/DL (ref 7–20)
CALCIUM SERPL-MCNC: 9.9 MG/DL (ref 8.3–10.6)
CHLORIDE BLD-SCNC: 99 MMOL/L (ref 99–110)
CHOLESTEROL, TOTAL: 124 MG/DL (ref 0–199)
CO2: 23 MMOL/L (ref 21–32)
CREAT SERPL-MCNC: 1.3 MG/DL (ref 0.8–1.3)
CREATININE URINE: 82.8 MG/DL (ref 39–259)
GFR AFRICAN AMERICAN: >60
GFR NON-AFRICAN AMERICAN: 54
GLUCOSE BLD-MCNC: 83 MG/DL (ref 70–99)
HDLC SERPL-MCNC: 57 MG/DL (ref 40–60)
LDL CHOLESTEROL CALCULATED: 51 MG/DL
MICROALBUMIN UR-MCNC: 8.6 MG/DL
MICROALBUMIN/CREAT UR-RTO: 103.9 MG/G (ref 0–30)
POTASSIUM SERPL-SCNC: 5.2 MMOL/L (ref 3.5–5.1)
SODIUM BLD-SCNC: 138 MMOL/L (ref 136–145)
TOTAL PROTEIN: 7.1 G/DL (ref 6.4–8.2)
TRIGL SERPL-MCNC: 78 MG/DL (ref 0–150)
VLDLC SERPL CALC-MCNC: 16 MG/DL

## 2022-03-09 LAB
ESTIMATED AVERAGE GLUCOSE: 208.7 MG/DL
HBA1C MFR BLD: 8.9 %

## 2022-03-10 ENCOUNTER — OFFICE VISIT (OUTPATIENT)
Dept: ENDOCRINOLOGY | Age: 77
End: 2022-03-10
Payer: MEDICARE

## 2022-03-10 VITALS
SYSTOLIC BLOOD PRESSURE: 132 MMHG | RESPIRATION RATE: 14 BRPM | HEART RATE: 63 BPM | HEIGHT: 72 IN | WEIGHT: 212 LBS | OXYGEN SATURATION: 99 % | TEMPERATURE: 98 F | DIASTOLIC BLOOD PRESSURE: 68 MMHG | BODY MASS INDEX: 28.71 KG/M2

## 2022-03-10 DIAGNOSIS — I10 ESSENTIAL HYPERTENSION: ICD-10-CM

## 2022-03-10 DIAGNOSIS — N18.30 STAGE 3 CHRONIC KIDNEY DISEASE, UNSPECIFIED WHETHER STAGE 3A OR 3B CKD (HCC): ICD-10-CM

## 2022-03-10 DIAGNOSIS — E66.3 OVERWEIGHT (BMI 25.0-29.9): ICD-10-CM

## 2022-03-10 DIAGNOSIS — E78.00 PURE HYPERCHOLESTEROLEMIA: ICD-10-CM

## 2022-03-10 DIAGNOSIS — E11.3293 MILD NONPROLIFERATIVE DIABETIC RETINOPATHY OF BOTH EYES WITHOUT MACULAR EDEMA ASSOCIATED WITH TYPE 2 DIABETES MELLITUS (HCC): ICD-10-CM

## 2022-03-10 PROCEDURE — 3052F HG A1C>EQUAL 8.0%<EQUAL 9.0%: CPT | Performed by: INTERNAL MEDICINE

## 2022-03-10 PROCEDURE — 99214 OFFICE O/P EST MOD 30 MIN: CPT | Performed by: INTERNAL MEDICINE

## 2022-03-10 RX ORDER — INSULIN GLARGINE 100 [IU]/ML
30 INJECTION, SOLUTION SUBCUTANEOUS NIGHTLY
Qty: 5 PEN | Refills: 3
Start: 2022-03-10 | End: 2022-05-25

## 2022-03-10 RX ORDER — KRILL/OM-3/DHA/EPA/PHOSPHO/AST 500MG-86MG
CAPSULE ORAL
COMMUNITY

## 2022-03-10 NOTE — PROGRESS NOTES
Fuentes Neumann is a 78 y.o. male who presents for Type 2 diabetes mellitus. Current symptoms/problems include hyperglycemia and are worsening. 1.  DM type 2, uncontrolled, with neuropathy ([facility] [address] [E11.40, E11.65]    Diagnosed with Type 2 diabetes mellitus in 1979.     Comorbid conditions: hyperlipidemia, Neuropathy and Retinopathy    Current diabetic medications include: metaglip 5/500 mg 2 tabs bid, pioglitazone 15 mg qd, Lantus 30 units qhs  Has a lot of stress    Intolerance to diabetes medications: Trulicity     Weight trend: stable  Prior visit with dietician: yes  Current diet: on average, 2 meals per day, eats moderately healthy  Current exercise: walks     Current monitoring regimen: home blood tests - 1 times daily  Has brought blood glucose log/meter:  Yes  Home blood sugar records: fasting range:  and postprandial range:    Any episodes of hypoglycemia? No  Hypoglycemia frequency and time(s):    Does patient have Glucagon emergency kit? No  Does patient have rapid acting carbohydrate? Yes  Does patient wear a medic alert bracelet or necklace? No    2. Mild nonproliferative diabetic retinopathy without macular edema associated with type 2 diabetes mellitus, unspecified laterality ([address]  Vision is OK. 3. Pure hypercholesterolemia  No muscle pain. 4. Essential hypertension  No headaches. 5. Overweight (BMI 25.0-29. 9)  Eats moderately healthy. Walks.     6. CKD stage 3 secondary to diabetes [facility])  Has prostate problems, see Urologist.    7. Nephropathy  Has urination problems      Past Medical History:   Diagnosis Date    Elevated prostate specific antigen (PSA)     Esophageal reflux     Other and unspecified hyperlipidemia     Screening PSA (prostate specific antigen) 9/17/2009    2.9    Type 2 diabetes mellitus, uncontrolled ([facility] [address]     Dr. 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, not stated as uncontrolled(250.50)     Type II or unspecified type diabetes mellitus without mention of complication, not stated as uncontrolled     Unspecified essential hypertension       Patient Active Problem List   Diagnosis    Pure hypercholesterolemia    Essential hypertension    Esophageal reflux    Mild nonproliferative diabetic retinopathy without macular edema associated with type 2 diabetes mellitus (HCC)    Elevated prostate specific antigen (PSA)    Proteinuria    Achilles tendinitis of left lower extremity    DM type 2, uncontrolled, with neuropathy ([address]    Overweight (BMI 25.0-29. 9)    Chronic kidney disease, stage 3    Urinary frequency    Uncontrolled type 2 diabetes mellitus with diabetic nephropathy (HCC)    Age-related cataract     Past Surgical History:   Procedure Laterality Date    CATARACT REMOVAL WITH IMPLANT      COLONOSCOPY  8/5/2009    Normal-moreno 2012-14    HERNIA REPAIR      TONSILLECTOMY       Social History     Socioeconomic History    Marital status:      Spouse name: Not on file    Number of children: Not on file    Years of education: Not on file    Highest education level: Not on file   Occupational History    Not on file   Tobacco Use    Smoking status: Never Smoker    Smokeless tobacco: Never Used   Vaping Use    Vaping Use: Never used   Substance and Sexual Activity    Alcohol use: No    Drug use: No    Sexual activity: Yes     Partners: Female   Other Topics Concern    Not on file   Social History Narrative    Not on file     Social Determinants of Health     Financial Resource Strain:     Difficulty of Paying Living Expenses: Not on file   Food Insecurity:     Worried About Running Out of Food in the Last Year: Not on file    [place] of Food in the Last Year: Not on file   Transportation Needs:     Lack of Transportation (Medical): Not on file    Lack of Transportation (Non-Medical):  Not on file   Physical Activity:     Days of Exercise per Week: Not on file    Minutes of Exercise per Session: Not on file   Stress:     Feeling of Stress : Not on file   Social Connections:     Frequency of Communication with Friends and Family: Not on file    Frequency of Social Gatherings with Friends and Family: Not on file    Attends Mandaen Services: Not on file    Active Member of [address] [place] [address] or Organizations: Not on file    Attends Club or Organization Meetings: Not on file    Marital Status: Not on file   Intimate Partner Violence:     Fear of Current or Ex-Partner: Not on file    Emotionally Abused: Not on file    Physically Abused: Not on file    Sexually Abused: Not on file   Housing Stability:     Unable to Pay for Housing in the Last Year: Not on file    Number of [place] in the Last Year: Not on file    Unstable Housing in the Last Year: Not on file     Family History   Problem Relation Age of Onset    Colon Cancer Father     Hypertension Mother      Current Outpatient Medications   Medication Sig Dispense Refill    Krill Oil 500 MG CAPS Take by mouth      insulin glargine (LANTUS SOLOSTAR) 100 UNIT/ML injection pen Inject 30 Units into the skin nightly 5 pen 3    ONETOUCH ULTRA strip USE ONE STRIP TO TEST FOUR TIMES A  strip 3    atorvastatin (LIPITOR) 40 MG tablet TAKE 1 TABLET DAILY (DISCONTINUE PRAVACHOL) 90 tablet 3    glipiZIDE-metFORMIN (METAGLIP) 5-500 MG per tablet TAKE 2 TABLETS TWICE A DAY BEFORE MEALS 360 tablet 3    losartan-hydroCHLOROthiazide (HYZAAR) 100-12.5 MG per tablet TAKE 1 TABLET DAILY 90 tablet 3    pioglitazone (ACTOS) 15 MG tablet TAKE 1 TABLET DAILY 90 tablet 3    NOVOFINE 32G X 6 MM MISC daily 100 each 1    amLODIPine (NORVASC) 5 MG tablet TAKE 1 TABLET DAILY 90 tablet 3    omeprazole (PRILOSEC) 20 MG delayed release capsule TAKE 1 CAPSULE DAILY 90 capsule 3    tamsulosin (FLOMAX) 0.4 MG capsule       aspirin 81 MG tablet Take 81 mg by mouth daily      Multiple Vitamins-Minerals (CENTRUM SILVER PO) Take by mouth      cetirizine (ZYRTEC) 10 MG tablet Take 10 mg by mouth daily      Omega-3 Fatty Acids (FISH OIL) 1000 MG CAPS Take 1,000 mg by mouth daily  (Patient not taking: Reported on 3/10/2022)       No current facility-administered medications for this visit.      Allergies   Allergen Reactions    Lisinopril Swelling     Neck swelling w ace     Family Status   Relation Name Status    Father   at age 62        Colon CA    Mother   at age 80        born 1920-\"old age\"   Patric Anne Brother  Alive    Brother  Alive    Sister  Alive    Sister  Alive    Sister  Alive    PAunt          DM       Lab Review:    Lab Results   Component Value Date    WBC 6.5 2021    HGB 11.6 2021    HCT 34.4 2021    MCV 90.5 2021     2021     Lab Results   Component Value Date     2022    K 5.2 2022    CL 99 2022    CO2 23 2022    BUN 26 2022    CREATININE 1.3 2022    GLUCOSE 83 2022    CALCIUM 9.9 2022    PROT 7.1 2022    PROT 6.9 2010    LABALBU 4.5 2022    BILITOT 0.3 2022    ALKPHOS 59 2022    AST 19 2022    ALT 20 2022    LABGLOM 54 2022    LABGLOM 53 2010    GFRAA >60 2022    AGRATIO 1.7 2022    GLOB 2.7 08/10/2021     Lab Results   Component Value Date    TSH 0.97 2021     Lab Results   Component Value Date    LABA1C 8.9 2022     Lab Results   Component Value Date    .7 2022     Lab Results   Component Value Date    CHOL 124 2022     Lab Results   Component Value Date    TRIG 78 2022     Lab Results   Component Value Date    HDL 57 2022     Lab Results   Component Value Date    LDLCALC 51 2022     Lab Results   Component Value Date    LABVLDL 16 2022     Lab Results   Component Value Date    CHOLHDLRATIO 2.9 2010     Lab Results   Component Value Date    LABMICR 8.60 2022     Lab Results   Component Value Date    VITD25 37 11/08/2010        Review of Systems:  Constitutional: no fatigue, no fever, no recent weight gain, no recent weight loss, no changes in appetite  Eyes: no eye pain, no change in vision, no eye redness, no eye irritation, no double vision  Ears, nose, throat: no sore throat, no earache, no decrease in hearing, no hoarseness, no dry mouth, has sinus problems, no difficulty swallowing, no neck lumps, no dental problems, no mouth sores, no ringing in ears  Pulmonary: no shortness of breath, no wheezing, no dyspnea on exertion, no cough  Cardiovascular: no chest pain, no lower extremity edema, no orthopnea, no intermittent leg claudication, no palpitations  Gastrointestinal: no abdominal pain, no nausea, no vomiting, no diarrhea, no constipation, no dysphagia, has heartburn, no bloating  Genitourinary: no dysuria, no urinary incontinence, no urinary hesitancy, no urinary frequency, no feelings of urinary urgency, has nocturia  Musculoskeletal: no joint swelling, no joint stiffness, has joint pain, no muscle cramps, no muscle pain  Integument/Breast: no skin rashes, no skin lesions, no itching, no dry skin  Neurological: no numbness, no tingling, no weakness, no confusion, no headaches, no dizziness, no fainting, no tremors, no decrease in memory, no balance problems  Psychiatric: no anxiety, no depression, no insomnia  Hematologic/Lymphatic: no tendency for easy bleeding, no swollen lymph nodes, no tendency for easy bruising  Immunology: has seasonal allergies, no frequent infections, no frequent illnesses  Endocrine: no temperature intolerance    /68   Pulse 63   Temp 98 °F (36.7 °C)   Resp 14   Ht 6' (1.829 m)   Wt 212 lb (96.2 kg)   SpO2 99%   BMI 28.75 kg/m²    Wt Readings from Last 3 Encounters:   03/10/22 212 lb (96.2 kg)   12/02/21 213 lb (96.6 kg)   08/12/21 211 lb (95.7 kg)     Body mass index is 28.75 kg/m².       OBJECTIVE:  Constitutional: no acute distress, well appearing and well nourished  Psychiatric: oriented to person, place and time, judgement and insight and normal, recent and remote memory and intact and mood and affect are normal  Skin: skin and subcutaneous tissue is normal without mass, normal turgor  Head and Face: examination of head and face revealed no abnormalities  Eyes: no lid or conjunctival swelling, erythema or discharge, pupils are normal, equal, round, reactive to light  Ears/Nose: external inspection of ears and nose revealed no abnormalities, hearing is grossly normal  Oropharynx/Mouth/Face: lips, tongue and gums are normal with no lesions, the voice quality was normal  Neck: neck is supple and symmetric, with midline trachea and no masses, thyroid is normal  Lymphatics: normal cervical lymph nodes, normal supraclavicular nodes  Pulmonary: no increased work of breathing or signs of respiratory distress, lungs are clear to auscultation  Cardiovascular: normal heart rate and rhythm, normal S1 and S2, no murmurs and pedal pulses and 2+ bilaterally, No edema  Abdomen: abdomen is soft, non-tender with no masses  Musculoskeletal: normal gait and station and exam of the digits and nails are normal  Neurological: normal coordination and normal general cortical function    Visual inspection:  Deformity/amputation: absent  Skin lesions/pre-ulcerative calluses: present calluses  Edema: right- negative, left- negative     Sensory exam:  Monofilament sensation: normal  (minimum of 5 random plantar locations tested, avoiding callused areas - > 1 area with absence of sensation is + for neuropathy)     Plus at least one of the following:  Pulses: normal  Proprioception: Intact  Vibration (128 Hz): Impaired    ASSESSMENT/PLAN:  1. DM type 2, uncontrolled, with neuropathy (HCC)  Lantus 30 units qhs  Suggested prandial insulin, but patient refuses at this time.   He will continue more aggressive diet and exercise  Check 4 times daily  HbA1C 8.4-7.7-8.2-7.4-8.4-8.3-9.3-7.8-8.1-8.9  Consider Jardiance/Farxiga, but patient refused  Diet, exercise. Hx of pancreatitis  - Metaglip   - Pioglitazone  - Comprehensive Metabolic Panel; Future  - Hemoglobin A1C; Future  - Lipid Panel; Future  - Microalbumin / Creatinine Urine Ratio; Future    2. Mild nonproliferative diabetic retinopathy without macular edema associated with type 2 diabetes mellitus, unspecified laterality ([facility]  Follow with Ophthalmologist    3. Pure hypercholesterolemia  Continue pravastatin. LDL 03-58-60-60-14-29-46-51    4. Essential hypertension  Losartan- HCTZ. 5. Overweight (BMI 25.0-29. 9)  Diet, exercise. 6. CKD stage 3 ([facility]  Follows with Urologist.  On losartan  Creatinine 1.2-1.3-1.1-1.2-1.3-1.2-1.3-1.2  GFR 79-89-93-50-17-13-54-59    7. Nephropathy  Microalbumin creatinine ratio 197-80.3-53.6-65.8-103.9  Continue losartan    Reviewed and/or ordered clinical lab results Yes  Reviewed and/or ordered radiology tests No  Reviewed and/or ordered other diagnostic tests No  Discussed test results with performing physician No  Independently reviewed image, tracing, or specimen No  Made a decision to obtain old records No  Reviewed and summarized old records Yes  Hemoglobin A1c 8.5  Obtained history from other than patient No    Hadley Chandler was counseled regarding symptoms of diabetes diagnosis, course and complications of disease if inadequately treated, side effects of medications, diagnosis, treatment options, and prognosis, risks, benefits, complications, and alternatives of treatment, labs, imaging and other studies and treatment targets and goals. He understands instructions and counseling. Return in about 3 months (around 6/10/2022) for diabetes.

## 2022-05-25 RX ORDER — INSULIN GLARGINE 100 [IU]/ML
INJECTION, SOLUTION SUBCUTANEOUS
Qty: 45 ML | Refills: 0 | Status: SHIPPED | OUTPATIENT
Start: 2022-05-25 | End: 2022-06-23 | Stop reason: SDUPTHER

## 2022-06-21 DIAGNOSIS — I10 ESSENTIAL HYPERTENSION: ICD-10-CM

## 2022-06-21 DIAGNOSIS — E11.3293 MILD NONPROLIFERATIVE DIABETIC RETINOPATHY OF BOTH EYES WITHOUT MACULAR EDEMA ASSOCIATED WITH TYPE 2 DIABETES MELLITUS (HCC): ICD-10-CM

## 2022-06-21 DIAGNOSIS — E78.00 PURE HYPERCHOLESTEROLEMIA: ICD-10-CM

## 2022-06-21 LAB
A/G RATIO: 1.4 (ref 1.1–2.2)
ALBUMIN SERPL-MCNC: 4.2 G/DL (ref 3.4–5)
ALP BLD-CCNC: 57 U/L (ref 40–129)
ALT SERPL-CCNC: 15 U/L (ref 10–40)
ANION GAP SERPL CALCULATED.3IONS-SCNC: 14 MMOL/L (ref 3–16)
AST SERPL-CCNC: 18 U/L (ref 15–37)
BILIRUB SERPL-MCNC: 0.4 MG/DL (ref 0–1)
BUN BLDV-MCNC: 41 MG/DL (ref 7–20)
CALCIUM SERPL-MCNC: 10.1 MG/DL (ref 8.3–10.6)
CHLORIDE BLD-SCNC: 99 MMOL/L (ref 99–110)
CHOLESTEROL, TOTAL: 133 MG/DL (ref 0–199)
CO2: 24 MMOL/L (ref 21–32)
CREAT SERPL-MCNC: 1.4 MG/DL (ref 0.8–1.3)
CREATININE URINE: 69.6 MG/DL (ref 39–259)
GFR AFRICAN AMERICAN: 60
GFR NON-AFRICAN AMERICAN: 49
GLUCOSE BLD-MCNC: 99 MG/DL (ref 70–99)
HDLC SERPL-MCNC: 54 MG/DL (ref 40–60)
LDL CHOLESTEROL CALCULATED: 59 MG/DL
MICROALBUMIN UR-MCNC: 2.7 MG/DL
MICROALBUMIN/CREAT UR-RTO: 38.8 MG/G (ref 0–30)
POTASSIUM SERPL-SCNC: 5.2 MMOL/L (ref 3.5–5.1)
SODIUM BLD-SCNC: 137 MMOL/L (ref 136–145)
TOTAL PROTEIN: 7.2 G/DL (ref 6.4–8.2)
TRIGL SERPL-MCNC: 98 MG/DL (ref 0–150)
VLDLC SERPL CALC-MCNC: 20 MG/DL

## 2022-06-22 LAB
ESTIMATED AVERAGE GLUCOSE: 214.5 MG/DL
HBA1C MFR BLD: 9.1 %

## 2022-06-23 ENCOUNTER — OFFICE VISIT (OUTPATIENT)
Dept: ENDOCRINOLOGY | Age: 77
End: 2022-06-23
Payer: MEDICARE

## 2022-06-23 VITALS
HEART RATE: 73 BPM | TEMPERATURE: 98 F | BODY MASS INDEX: 28.99 KG/M2 | SYSTOLIC BLOOD PRESSURE: 124 MMHG | OXYGEN SATURATION: 98 % | WEIGHT: 214 LBS | RESPIRATION RATE: 14 BRPM | HEIGHT: 72 IN | DIASTOLIC BLOOD PRESSURE: 62 MMHG

## 2022-06-23 DIAGNOSIS — E66.3 OVERWEIGHT (BMI 25.0-29.9): ICD-10-CM

## 2022-06-23 DIAGNOSIS — E11.3293 MILD NONPROLIFERATIVE DIABETIC RETINOPATHY OF BOTH EYES WITHOUT MACULAR EDEMA ASSOCIATED WITH TYPE 2 DIABETES MELLITUS (HCC): ICD-10-CM

## 2022-06-23 DIAGNOSIS — N18.30 STAGE 3 CHRONIC KIDNEY DISEASE, UNSPECIFIED WHETHER STAGE 3A OR 3B CKD (HCC): ICD-10-CM

## 2022-06-23 DIAGNOSIS — I10 ESSENTIAL HYPERTENSION: ICD-10-CM

## 2022-06-23 DIAGNOSIS — E78.00 PURE HYPERCHOLESTEROLEMIA: ICD-10-CM

## 2022-06-23 PROCEDURE — 1123F ACP DISCUSS/DSCN MKR DOCD: CPT | Performed by: INTERNAL MEDICINE

## 2022-06-23 PROCEDURE — 99214 OFFICE O/P EST MOD 30 MIN: CPT | Performed by: INTERNAL MEDICINE

## 2022-06-23 PROCEDURE — 3046F HEMOGLOBIN A1C LEVEL >9.0%: CPT | Performed by: INTERNAL MEDICINE

## 2022-06-23 RX ORDER — LOSARTAN POTASSIUM AND HYDROCHLOROTHIAZIDE 12.5; 1 MG/1; MG/1
TABLET ORAL
Qty: 90 TABLET | Refills: 1 | Status: SHIPPED | OUTPATIENT
Start: 2022-06-23 | End: 2022-09-29 | Stop reason: SDUPTHER

## 2022-06-23 RX ORDER — INSULIN GLARGINE 100 [IU]/ML
INJECTION, SOLUTION SUBCUTANEOUS
Qty: 45 ML | Refills: 2 | Status: SHIPPED | OUTPATIENT
Start: 2022-06-23 | End: 2022-09-29 | Stop reason: SDUPTHER

## 2022-06-23 RX ORDER — ATORVASTATIN CALCIUM 40 MG/1
TABLET, FILM COATED ORAL
Qty: 90 TABLET | Refills: 1 | Status: SHIPPED | OUTPATIENT
Start: 2022-06-23 | End: 2022-09-29 | Stop reason: SDUPTHER

## 2022-06-23 RX ORDER — GLIPIZIDE AND METFORMIN HCL 5; 500 MG/1; MG/1
TABLET, FILM COATED ORAL
Qty: 360 TABLET | Refills: 1 | Status: SHIPPED | OUTPATIENT
Start: 2022-06-23 | End: 2022-09-29 | Stop reason: SDUPTHER

## 2022-06-23 RX ORDER — PIOGLITAZONEHYDROCHLORIDE 15 MG/1
TABLET ORAL
Qty: 90 TABLET | Refills: 1 | Status: SHIPPED | OUTPATIENT
Start: 2022-06-23 | End: 2022-09-29 | Stop reason: SDUPTHER

## 2022-06-23 NOTE — PROGRESS NOTES
Zoey Perez is a 78 y.o. male who presents for Type 2 diabetes mellitus. Current symptoms/problems include hyperglycemia and are worsening. 1.  DM type 2, uncontrolled, with neuropathy ([address] [E11.40, E11.65]    Diagnosed with Type 2 diabetes mellitus in 1979.     Comorbid conditions: hyperlipidemia, Neuropathy and Retinopathy    Current diabetic medications include: metaglip 5/500 mg 2 tabs bid, pioglitazone 15 mg qd, Lantus 30 units qhs  Has a lot of stress    Intolerance to diabetes medications: Trulicity     Weight trend: stable  Prior visit with dietician: yes  Current diet: on average, 2 meals per day, eats moderately healthy  Current exercise: walks     Current monitoring regimen: home blood tests - 1 times daily  Has brought blood glucose log/meter:  Yes  Home blood sugar records: fasting range:  and postprandial range:    Any episodes of hypoglycemia? No  Hypoglycemia frequency and time(s):    Does patient have Glucagon emergency kit? No  Does patient have rapid acting carbohydrate? Yes  Does patient wear a medic alert bracelet or necklace? No    2. Mild nonproliferative diabetic retinopathy without macular edema associated with type 2 diabetes mellitus, unspecified laterality ([address]  Vision is OK. 3. Pure hypercholesterolemia  No muscle pain. 4. Essential hypertension  No headaches. 5. Overweight (BMI 25.0-29. 9)  Eats moderately healthy. Walks.     6. CKD stage 3 secondary to diabetes [facility])  Has prostate problems, see Urologist.    7. Nephropathy  Has urination problems      Past Medical History:   Diagnosis Date    Elevated prostate specific antigen (PSA)     Esophageal reflux     Other and unspecified hyperlipidemia     Screening PSA (prostate specific antigen) 9/17/2009    2.9    Type 2 diabetes mellitus, uncontrolled ([address]     Dr. 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, not stated as uncontrolled(250.50)     Type II or unspecified type diabetes mellitus without mention of complication, not stated as uncontrolled     Unspecified essential hypertension       Patient Active Problem List   Diagnosis    Pure hypercholesterolemia    Essential hypertension    Esophageal reflux    Mild nonproliferative diabetic retinopathy without macular edema associated with type 2 diabetes mellitus (HCC)    Elevated prostate specific antigen (PSA)    Proteinuria    Achilles tendinitis of left lower extremity    DM type 2, uncontrolled, with neuropathy ([address]    Overweight (BMI 25.0-29. 9)    Chronic kidney disease, stage 3    Urinary frequency    Uncontrolled type 2 diabetes mellitus with diabetic nephropathy (HCC)    Age-related cataract     Past Surgical History:   Procedure Laterality Date    CATARACT REMOVAL WITH IMPLANT      COLONOSCOPY  8/5/2009    Normal-moreno 2012-14    HERNIA REPAIR      TONSILLECTOMY       Social History     Socioeconomic History    Marital status:      Spouse name: Not on file    Number of children: Not on file    Years of education: Not on file    Highest education level: Not on file   Occupational History    Not on file   Tobacco Use    Smoking status: Never Smoker    Smokeless tobacco: Never Used   Vaping Use    Vaping Use: Never used   Substance and Sexual Activity    Alcohol use: No    Drug use: No    Sexual activity: Yes     Partners: Female   Other Topics Concern    Not on file   Social History Narrative    Not on file     Social Determinants of Health     Financial Resource Strain:     Difficulty of Paying Living Expenses: Not on file   Food Insecurity:     Worried About Running Out of Food in the Last Year: Not on file    [place] of Food in the Last Year: Not on file   Transportation Needs:     Lack of Transportation (Medical): Not on file    Lack of Transportation (Non-Medical):  Not on file   Physical Activity:     Days of Exercise per Week: Not on file    Minutes of Exercise per Session: Not on file   Stress:     Feeling of Stress : Not on file   Social Connections:     Frequency of Communication with Friends and Family: Not on file    Frequency of Social Gatherings with Friends and Family: Not on file    Attends Druze Services: Not on file    Active Member of [address] [place] [address] or Organizations: Not on file    Attends Club or Organization Meetings: Not on file    Marital Status: Not on file   Intimate Partner Violence:     Fear of Current or Ex-Partner: Not on file    Emotionally Abused: Not on file    Physically Abused: Not on file    Sexually Abused: Not on file   Housing Stability:     Unable to Pay for Housing in the Last Year: Not on file    Number of [place] in the Last Year: Not on file    Unstable Housing in the Last Year: Not on file     Family History   Problem Relation Age of Onset    Colon Cancer Father     Hypertension Mother      Current Outpatient Medications   Medication Sig Dispense Refill    Insulin Pen Needle 32G X 6 MM MISC Use to Monitor  each 3    pioglitazone (ACTOS) 15 MG tablet TAKE 1 TABLET DAILY 90 tablet 1    losartan-hydroCHLOROthiazide (HYZAAR) 100-12.5 MG per tablet TAKE 1 TABLET DAILY 90 tablet 1    glipiZIDE-metFORMIN (METAGLIP) 5-500 MG per tablet TAKE 2 TABLETS TWICE A DAY BEFORE MEALS 360 tablet 1    insulin glargine (LANTUS SOLOSTAR) 100 UNIT/ML injection pen INJECT 30 UNITS SUBQ QHS 45 mL 2    dapagliflozin (FARXIGA) 5 MG tablet Take 1 tablet by mouth every morning 90 tablet 1    atorvastatin (LIPITOR) 40 MG tablet TAKE 1 TABLET DAILY (DISCONTINUE PRAVACHOL) 90 tablet 1    Krill Oil 500 MG CAPS Take by mouth      ONETOUCH ULTRA strip USE ONE STRIP TO TEST FOUR TIMES A  strip 3    amLODIPine (NORVASC) 5 MG tablet TAKE 1 TABLET DAILY 90 tablet 3    omeprazole (PRILOSEC) 20 MG delayed release capsule TAKE 1 CAPSULE DAILY 90 capsule 3    tamsulosin (FLOMAX) 0.4 MG capsule       aspirin 81 MG tablet Take 81 mg by mouth daily      Multiple Vitamins-Minerals (CENTRUM SILVER PO) Take by mouth      cetirizine (ZYRTEC) 10 MG tablet Take 10 mg by mouth daily      Omega-3 Fatty Acids (FISH OIL) 1000 MG CAPS Take 1,000 mg by mouth daily        No current facility-administered medications for this visit.      Allergies   Allergen Reactions    Lisinopril Swelling     Neck swelling w ace     Family Status   Relation Name Status    Father   at age 62        Colon CA    Mother   at age 80        born 1920-\"old age\"   Clinton Morfin Brother  Alive   Clinton Morfin Brother  Alive    Sister  Alive    Sister  Alive    Sister  Alive    PAunt          DM       Lab Review:    Lab Results   Component Value Date    WBC 6.5 2021    HGB 11.6 2021    HCT 34.4 2021    MCV 90.5 2021     2021     Lab Results   Component Value Date     2022    K 5.2 2022    CL 99 2022    CO2 24 2022    BUN 41 2022    CREATININE 1.4 2022    GLUCOSE 99 2022    CALCIUM 10.1 2022    PROT 7.2 2022    PROT 6.9 2010    LABALBU 4.2 2022    BILITOT 0.4 2022    ALKPHOS 57 2022    AST 18 2022    ALT 15 2022    LABGLOM 49 2022    LABGLOM 53 2010    GFRAA 60 2022    AGRATIO 1.4 2022    GLOB 2.7 08/10/2021     Lab Results   Component Value Date    TSH 0.97 2021     Lab Results   Component Value Date    LABA1C 9.1 2022     Lab Results   Component Value Date    .5 2022     Lab Results   Component Value Date    CHOL 133 2022     Lab Results   Component Value Date    TRIG 98 2022     Lab Results   Component Value Date    HDL 54 2022     Lab Results   Component Value Date    LDLCALC 59 2022     Lab Results   Component Value Date    LABVLDL 20 2022     Lab Results   Component Value Date    CHOLHDLRATIO 2.9 2010     Lab Results   Component Value Date    LABMICR 2.70 06/21/2022     Lab Results   Component Value Date    VITD25 37 11/08/2010        Review of Systems:  Constitutional: no fatigue, no fever, no recent weight gain, no recent weight loss, no changes in appetite  Eyes: no eye pain, no change in vision, no eye redness, no eye irritation, no double vision  Ears, nose, throat: no sore throat, no earache, no decrease in hearing, no hoarseness, no dry mouth, has sinus problems, no difficulty swallowing, no neck lumps, no dental problems, no mouth sores, no ringing in ears  Pulmonary: no shortness of breath, no wheezing, no dyspnea on exertion, no cough  Cardiovascular: no chest pain, no lower extremity edema, no orthopnea, no intermittent leg claudication, no palpitations  Gastrointestinal: no abdominal pain, no nausea, no vomiting, no diarrhea, no constipation, no dysphagia, has heartburn, no bloating  Genitourinary: no dysuria, no urinary incontinence, no urinary hesitancy, no urinary frequency, no feelings of urinary urgency, has nocturia  Musculoskeletal: no joint swelling, no joint stiffness, has joint pain, no muscle cramps, no muscle pain  Integument/Breast: no skin rashes, no skin lesions, no itching, no dry skin  Neurological: no numbness, no tingling, no weakness, no confusion, no headaches, no dizziness, no fainting, no tremors, no decrease in memory, no balance problems  Psychiatric: no anxiety, no depression, no insomnia  Hematologic/Lymphatic: no tendency for easy bleeding, no swollen lymph nodes, no tendency for easy bruising  Immunology: has seasonal allergies, no frequent infections, no frequent illnesses  Endocrine: no temperature intolerance    /62   Pulse 73   Temp 98 °F (36.7 °C)   Resp 14   Ht 6' (1.829 m)   Wt 214 lb (97.1 kg)   SpO2 98%   BMI 29.02 kg/m²    Wt Readings from Last 3 Encounters:   06/23/22 214 lb (97.1 kg)   03/10/22 212 lb (96.2 kg)   12/02/21 213 lb (96.6 kg)     Body mass index is 29.02 kg/m². OBJECTIVE:  Constitutional: no acute distress, well appearing and well nourished  Psychiatric: oriented to person, place and time, judgement and insight and normal, recent and remote memory and intact and mood and affect are normal  Skin: skin and subcutaneous tissue is normal without mass, normal turgor  Head and Face: examination of head and face revealed no abnormalities  Eyes: no lid or conjunctival swelling, erythema or discharge, pupils are normal, equal, round, reactive to light  Ears/Nose: external inspection of ears and nose revealed no abnormalities, hearing is grossly normal  Oropharynx/Mouth/Face: lips, tongue and gums are normal with no lesions, the voice quality was normal  Neck: neck is supple and symmetric, with midline trachea and no masses, thyroid is normal  Lymphatics: normal cervical lymph nodes, normal supraclavicular nodes  Pulmonary: no increased work of breathing or signs of respiratory distress, lungs are clear to auscultation  Cardiovascular: normal heart rate and rhythm, normal S1 and S2, no murmurs and pedal pulses and 2+ bilaterally, No edema  Abdomen: abdomen is soft, non-tender with no masses  Musculoskeletal: normal gait and station and exam of the digits and nails are normal  Neurological: normal coordination and normal general cortical function    Visual inspection:  Deformity/amputation: absent  Skin lesions/pre-ulcerative calluses: present calluses  Edema: right- negative, left- negative     Sensory exam:  Monofilament sensation: normal  (minimum of 5 random plantar locations tested, avoiding callused areas - > 1 area with absence of sensation is + for neuropathy)     Plus at least one of the following:  Pulses: normal  Proprioception: Intact  Vibration (128 Hz): Impaired    ASSESSMENT/PLAN:  1. DM type 2, uncontrolled, with neuropathy (HCC)  Lantus 30 units qhs  Suggested prandial insulin, but patient refuses at this time.   Start Farxiga 5 mg daily, gave samples, will call if tolerates  He will continue more diet and exercise  Check 4 times daily  HbA1C 8.4-7.7-8.2-7.4-8.4-8.3-9.3-7.8-8.1-8.9-9.1  Consider Jardiance/Farxiga, but patient refused  Diet, exercise. Hx of pancreatitis  - Metaglip   - Pioglitazone  - Comprehensive Metabolic Panel; Future  - Hemoglobin A1C; Future  - Lipid Panel; Future  - Microalbumin / Creatinine Urine Ratio; Future    2. Mild nonproliferative diabetic retinopathy without macular edema associated with type 2 diabetes mellitus, unspecified laterality ([facility]  Follow with Ophthalmologist    3. Pure hypercholesterolemia  Continue pravastatin. LDL 36-99-21-20-00-96-46-51-59    4. Essential hypertension  Losartan- HCTZ. 5. Overweight (BMI 25.0-29. 9)  Diet, exercise. 6. CKD stage 3 ([facility]  Follows with Urologist.  On losartan  Creatinine 1.2-1.3-1.1-1.2-1.3-1.2-1.3-1.2-1.4  GFR 91-41-38-23-83-67-54-59-49    7. Nephropathy  Microalbumin creatinine ratio 197-80.3-53.6-65.8-103.9-38.8  Continue losartan    Reviewed and/or ordered clinical lab results Yes  Reviewed and/or ordered radiology tests No  Reviewed and/or ordered other diagnostic tests No  Discussed test results with performing physician No  Independently reviewed image, tracing, or specimen No  Made a decision to obtain old records No  Reviewed and summarized old records Yes  Hemoglobin A1c 8.5  Obtained history from other than patient No    Radha Silverio was counseled regarding symptoms of diabetes diagnosis, course and complications of disease if inadequately treated, side effects of medications, diagnosis, treatment options, and prognosis, risks, benefits, complications, and alternatives of treatment, labs, imaging and other studies and treatment targets and goals. He understands instructions and counseling. Return in about 3 months (around 9/23/2022) for diabetes.

## 2022-07-05 ENCOUNTER — TELEPHONE (OUTPATIENT)
Dept: ENDOCRINOLOGY | Age: 77
End: 2022-07-05

## 2022-07-05 NOTE — TELEPHONE ENCOUNTER
Patient is requesting a 90 days supply of his dapagliflozin (FARXIGA) 5 MG tablet           [address], [address] [phone] - F [phone]   [place] [address]   Phone:  [phone]  Fax:  [phone]

## 2022-09-27 DIAGNOSIS — I10 ESSENTIAL HYPERTENSION: ICD-10-CM

## 2022-09-27 DIAGNOSIS — E78.00 PURE HYPERCHOLESTEROLEMIA: ICD-10-CM

## 2022-09-27 DIAGNOSIS — N18.30 STAGE 3 CHRONIC KIDNEY DISEASE, UNSPECIFIED WHETHER STAGE 3A OR 3B CKD (HCC): ICD-10-CM

## 2022-09-27 DIAGNOSIS — E11.3293 MILD NONPROLIFERATIVE DIABETIC RETINOPATHY OF BOTH EYES WITHOUT MACULAR EDEMA ASSOCIATED WITH TYPE 2 DIABETES MELLITUS (HCC): ICD-10-CM

## 2022-09-27 LAB
A/G RATIO: 1.6 (ref 1.1–2.2)
ALBUMIN SERPL-MCNC: 4.6 G/DL (ref 3.4–5)
ALP BLD-CCNC: 59 U/L (ref 40–129)
ALT SERPL-CCNC: 16 U/L (ref 10–40)
ANION GAP SERPL CALCULATED.3IONS-SCNC: 12 MMOL/L (ref 3–16)
AST SERPL-CCNC: 20 U/L (ref 15–37)
BILIRUB SERPL-MCNC: 0.3 MG/DL (ref 0–1)
BUN BLDV-MCNC: 43 MG/DL (ref 7–20)
CALCIUM SERPL-MCNC: 10.2 MG/DL (ref 8.3–10.6)
CHLORIDE BLD-SCNC: 103 MMOL/L (ref 99–110)
CHOLESTEROL, TOTAL: 134 MG/DL (ref 0–199)
CO2: 24 MMOL/L (ref 21–32)
CREAT SERPL-MCNC: 1.5 MG/DL (ref 0.8–1.3)
CREATININE URINE: 96.3 MG/DL (ref 39–259)
GFR AFRICAN AMERICAN: 55
GFR NON-AFRICAN AMERICAN: 46
GLUCOSE BLD-MCNC: 81 MG/DL (ref 70–99)
HDLC SERPL-MCNC: 56 MG/DL (ref 40–60)
LDL CHOLESTEROL CALCULATED: 58 MG/DL
MICROALBUMIN UR-MCNC: 3.7 MG/DL
MICROALBUMIN/CREAT UR-RTO: 38.4 MG/G (ref 0–30)
POTASSIUM SERPL-SCNC: 4.7 MMOL/L (ref 3.5–5.1)
SODIUM BLD-SCNC: 139 MMOL/L (ref 136–145)
T4 FREE: 1.6 NG/DL (ref 0.9–1.8)
TOTAL PROTEIN: 7.5 G/DL (ref 6.4–8.2)
TRIGL SERPL-MCNC: 100 MG/DL (ref 0–150)
TSH SERPL DL<=0.05 MIU/L-ACNC: 1.49 UIU/ML (ref 0.27–4.2)
VLDLC SERPL CALC-MCNC: 20 MG/DL

## 2022-09-28 LAB
ESTIMATED AVERAGE GLUCOSE: 180 MG/DL
HBA1C MFR BLD: 7.9 %

## 2022-09-29 ENCOUNTER — OFFICE VISIT (OUTPATIENT)
Dept: ENDOCRINOLOGY | Age: 77
End: 2022-09-29
Payer: MEDICARE

## 2022-09-29 VITALS
RESPIRATION RATE: 14 BRPM | WEIGHT: 210 LBS | HEIGHT: 72 IN | SYSTOLIC BLOOD PRESSURE: 138 MMHG | BODY MASS INDEX: 28.44 KG/M2 | HEART RATE: 75 BPM | DIASTOLIC BLOOD PRESSURE: 68 MMHG | TEMPERATURE: 98 F | OXYGEN SATURATION: 99 %

## 2022-09-29 DIAGNOSIS — E11.3293 MILD NONPROLIFERATIVE DIABETIC RETINOPATHY OF BOTH EYES WITHOUT MACULAR EDEMA ASSOCIATED WITH TYPE 2 DIABETES MELLITUS (HCC): ICD-10-CM

## 2022-09-29 DIAGNOSIS — I10 ESSENTIAL HYPERTENSION: ICD-10-CM

## 2022-09-29 DIAGNOSIS — N18.30 STAGE 3 CHRONIC KIDNEY DISEASE, UNSPECIFIED WHETHER STAGE 3A OR 3B CKD (HCC): ICD-10-CM

## 2022-09-29 DIAGNOSIS — E78.00 PURE HYPERCHOLESTEROLEMIA: ICD-10-CM

## 2022-09-29 PROCEDURE — 1123F ACP DISCUSS/DSCN MKR DOCD: CPT | Performed by: INTERNAL MEDICINE

## 2022-09-29 PROCEDURE — 99214 OFFICE O/P EST MOD 30 MIN: CPT | Performed by: INTERNAL MEDICINE

## 2022-09-29 PROCEDURE — 3051F HG A1C>EQUAL 7.0%<8.0%: CPT | Performed by: INTERNAL MEDICINE

## 2022-09-29 RX ORDER — PIOGLITAZONEHYDROCHLORIDE 15 MG/1
TABLET ORAL
Qty: 90 TABLET | Refills: 1 | Status: SHIPPED | OUTPATIENT
Start: 2022-09-29

## 2022-09-29 RX ORDER — LOSARTAN POTASSIUM AND HYDROCHLOROTHIAZIDE 12.5; 1 MG/1; MG/1
TABLET ORAL
Qty: 90 TABLET | Refills: 1 | Status: SHIPPED | OUTPATIENT
Start: 2022-09-29

## 2022-09-29 RX ORDER — BLOOD SUGAR DIAGNOSTIC
STRIP MISCELLANEOUS
Qty: 100 STRIP | Refills: 3 | Status: SHIPPED | OUTPATIENT
Start: 2022-09-29

## 2022-09-29 RX ORDER — GLIPIZIDE AND METFORMIN HCL 5; 500 MG/1; MG/1
TABLET, FILM COATED ORAL
Qty: 360 TABLET | Refills: 1 | Status: SHIPPED | OUTPATIENT
Start: 2022-09-29

## 2022-09-29 RX ORDER — ATORVASTATIN CALCIUM 40 MG/1
TABLET, FILM COATED ORAL
Qty: 90 TABLET | Refills: 1 | Status: SHIPPED | OUTPATIENT
Start: 2022-09-29

## 2022-09-29 RX ORDER — INSULIN GLARGINE 100 [IU]/ML
INJECTION, SOLUTION SUBCUTANEOUS
Qty: 45 ML | Refills: 2 | Status: SHIPPED | OUTPATIENT
Start: 2022-09-29

## 2022-09-29 NOTE — PROGRESS NOTES
Rodolfo Elizabeth is a 78 y.o. male who presents for Type 2 diabetes mellitus. Current symptoms/problems include  hyperglycemia  and are worsening. 1.  DM type 2, uncontrolled, with neuropathy [E11.40, E11.65]    Diagnosed with Type 2 diabetes mellitus in 1979. Comorbid conditions:  hyperlipidemia, Neuropathy and Retinopathy    Current diabetic medications include: metaglip 5/500 mg 2 tabs bid, pioglitazone 15 mg qd, Lantus 30 units qhs  Has a lot of stress    Intolerance to diabetes medications: Trulicity     Weight trend: stable  Prior visit with dietician: yes  Current diet: on average, 2 meals per day, eats moderately healthy  Current exercise: walks     Current monitoring regimen: home blood tests - 1 times daily  Has brought blood glucose log/meter:  Yes  Home blood sugar records: fasting range:  and postprandial range:    Any episodes of hypoglycemia? No  Hypoglycemia frequency and time(s):    Does patient have Glucagon emergency kit? No  Does patient have rapid acting carbohydrate? Yes  Does patient wear a medic alert bracelet or necklace? No    2. Mild nonproliferative diabetic retinopathy without macular edema associated with type 2 diabetes mellitus, unspecified laterality ([address]  Vision is OK. 3. Pure hypercholesterolemia  No muscle pain. 4. Essential hypertension  No headaches. 5. Overweight (BMI 25.0-29. 9)  Eats moderately healthy. Walks.     6. CKD stage 3 secondary to diabetes [facility])  Has prostate problems, see Urologist.    7. Nephropathy  Has urination problems      Past Medical History:   Diagnosis Date    Elevated prostate specific antigen (PSA)     Esophageal reflux     Other and unspecified hyperlipidemia     Screening PSA (prostate specific antigen) 9/17/2009    2.9    Type 2 diabetes mellitus, uncontrolled ([address]     Dr. 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, not stated as uncontrolled(250.50)     Type II or unspecified type diabetes mellitus without mention of complication, not stated as uncontrolled     Unspecified essential hypertension       Patient Active Problem List   Diagnosis    Pure hypercholesterolemia    Essential hypertension    Esophageal reflux    Mild nonproliferative diabetic retinopathy without macular edema associated with type 2 diabetes mellitus (HCC)    Elevated prostate specific antigen (PSA)    Proteinuria    Achilles tendinitis of left lower extremity    DM type 2, uncontrolled, with neuropathy (HCC)    Overweight (BMI 25.0-29. 9)    Chronic kidney disease, stage 3    Urinary frequency    Uncontrolled type 2 diabetes mellitus with diabetic nephropathy (HCC)    Age-related cataract     Past Surgical History:   Procedure Laterality Date    CATARACT REMOVAL WITH IMPLANT      COLONOSCOPY  8/5/2009    Normal-moreno 2012-14    HERNIA REPAIR      TONSILLECTOMY       Social History     Socioeconomic History    Marital status:      Spouse name: Not on file    Number of children: Not on file    Years of education: Not on file    Highest education level: Not on file   Occupational History    Not on file   Tobacco Use    Smoking status: Never    Smokeless tobacco: Never   Vaping Use    Vaping Use: Never used   Substance and Sexual Activity    Alcohol use: No    Drug use: No    Sexual activity: Yes     Partners: Female   Other Topics Concern    Not on file   Social History Narrative    Not on file     Social Determinants of Health     Financial Resource Strain: Not on file   Food Insecurity: Not on file   Transportation Needs: Not on file   Physical Activity: Not on file   Stress: Not on file   Social Connections: Not on file   Intimate Partner Violence: Not on file   Housing Stability: Not on file     Family History   Problem Relation Age of Onset    Colon Cancer Father     Hypertension Mother      Current Outpatient Medications   Medication Sig Dispense Refill    pioglitazone (ACTOS) 15 MG tablet TAKE 1 TABLET 08:46 AM    CO2 24 09/27/2022 08:46 AM    BUN 43 09/27/2022 08:46 AM    CREATININE 1.5 09/27/2022 08:46 AM    GLUCOSE 81 09/27/2022 08:46 AM    CALCIUM 10.2 09/27/2022 08:46 AM    PROT 7.5 09/27/2022 08:46 AM    PROT 6.9 11/08/2010 09:13 AM    LABALBU 4.6 09/27/2022 08:46 AM    BILITOT 0.3 09/27/2022 08:46 AM    ALKPHOS 59 09/27/2022 08:46 AM    AST 20 09/27/2022 08:46 AM    ALT 16 09/27/2022 08:46 AM    LABGLOM 46 09/27/2022 08:46 AM    LABGLOM 53 11/08/2010 09:13 AM    GFRAA 55 09/27/2022 08:46 AM    AGRATIO 1.6 09/27/2022 08:46 AM    GLOB 2.7 08/10/2021 11:18 AM     Lab Results   Component Value Date/Time    TSH 1.49 09/27/2022 08:46 AM     Lab Results   Component Value Date/Time    LABA1C 7.9 09/27/2022 08:46 AM     Lab Results   Component Value Date/Time    .0 09/27/2022 08:46 AM     Lab Results   Component Value Date/Time    CHOL 134 09/27/2022 08:46 AM     Lab Results   Component Value Date/Time    TRIG 100 09/27/2022 08:46 AM     Lab Results   Component Value Date/Time    HDL 56 09/27/2022 08:46 AM     Lab Results   Component Value Date/Time    LDLCALC 58 09/27/2022 08:46 AM     Lab Results   Component Value Date/Time    LABVLDL 20 09/27/2022 08:46 AM     Lab Results   Component Value Date/Time    CHOLHDLRATIO 2.9 11/08/2010 09:13 AM     Lab Results   Component Value Date/Time    LABMICR 3.70 09/27/2022 08:50 AM     Lab Results   Component Value Date/Time    VITD25 37 11/08/2010 09:13 AM        Review of Systems:  Constitutional: no fatigue, no fever, no recent weight gain, no recent weight loss, no changes in appetite  Eyes: no eye pain, no change in vision, no eye redness, no eye irritation, no double vision  Ears, nose, throat: no sore throat, no earache, no decrease in hearing, no hoarseness, no dry mouth, has sinus problems, no difficulty swallowing, no neck lumps, no dental problems, no mouth sores, no ringing in ears  Pulmonary: no shortness of breath, no wheezing, no dyspnea on exertion, no cough  Cardiovascular: no chest pain, no lower extremity edema, no orthopnea, no intermittent leg claudication, no palpitations  Gastrointestinal: no abdominal pain, no nausea, no vomiting, no diarrhea, no constipation, no dysphagia, has heartburn, no bloating  Genitourinary: no dysuria, no urinary incontinence, no urinary hesitancy, no urinary frequency, no feelings of urinary urgency, has nocturia  Musculoskeletal: no joint swelling, no joint stiffness, has joint pain, no muscle cramps, no muscle pain  Integument/Breast: no skin rashes, no skin lesions, no itching, no dry skin  Neurological: no numbness, no tingling, no weakness, no confusion, no headaches, no dizziness, no fainting, no tremors, no decrease in memory, no balance problems  Psychiatric: no anxiety, no depression, no insomnia  Hematologic/Lymphatic: no tendency for easy bleeding, no swollen lymph nodes, no tendency for easy bruising  Immunology: has seasonal allergies, no frequent infections, no frequent illnesses  Endocrine: no temperature intolerance    /68   Pulse 75   Temp 98 °F (36.7 °C)   Resp 14   Ht 6' (1.829 m)   Wt 210 lb (95.3 kg)   SpO2 99%   BMI 28.48 kg/m²    Wt Readings from Last 3 Encounters:   09/29/22 210 lb (95.3 kg)   06/23/22 214 lb (97.1 kg)   03/10/22 212 lb (96.2 kg)     Body mass index is 28.48 kg/m².       OBJECTIVE:  Constitutional: no acute distress, well appearing and well nourished  Psychiatric: oriented to person, place and time, judgement and insight and normal, recent and remote memory and intact and mood and affect are normal  Skin: skin and subcutaneous tissue is normal without mass, normal turgor  Head and Face: examination of head and face revealed no abnormalities  Eyes: no lid or conjunctival swelling, erythema or discharge, pupils are normal, equal, round, reactive to light  Ears/Nose: external inspection of ears and nose revealed no abnormalities, hearing is grossly normal  Oropharynx/Mouth/Face: lips, tongue and gums are normal with no lesions, the voice quality was normal  Neck: neck is supple and symmetric, with midline trachea and no masses, thyroid is normal  Lymphatics: normal cervical lymph nodes, normal supraclavicular nodes  Pulmonary: no increased work of breathing or signs of respiratory distress, lungs are clear to auscultation  Cardiovascular: normal heart rate and rhythm, normal S1 and S2, no murmurs and pedal pulses and 2+ bilaterally, No edema  Abdomen: abdomen is soft, non-tender with no masses  Musculoskeletal: normal gait and station and exam of the digits and nails are normal  Neurological: normal coordination and normal general cortical function    Visual inspection:  Deformity/amputation: absent  Skin lesions/pre-ulcerative calluses: present calluses  Edema: right- negative, left- negative     Sensory exam:  Monofilament sensation: normal  (minimum of 5 random plantar locations tested, avoiding callused areas - > 1 area with absence of sensation is + for neuropathy)     Plus at least one of the following:  Pulses: normal  Proprioception: Intact  Vibration (128 Hz): Impaired    ASSESSMENT/PLAN:  1. DM type 2, uncontrolled, with neuropathy (HCC)  Lantus 30 units qhs  Suggested prandial insulin, but patient refuses at this time. Increase Farxiga 10 mg daily  He will continue more diet and exercise  Check 4 times daily  HbA1C 8.4-7.7-8.2-7.4-8.4-8.3-9.3-7.8-8.1-8.9-9.1-7.9  Diet, exercise. Hx of pancreatitis, does not want GLP-1 analogs  - Metaglip   - Pioglitazone  - Comprehensive Metabolic Panel; Future  - Hemoglobin A1C; Future  - Lipid Panel; Future  - Microalbumin / Creatinine Urine Ratio; Future    2. Mild nonproliferative diabetic retinopathy without macular edema associated with type 2 diabetes mellitus, unspecified laterality ([facility] [address]  Follow with Ophthalmologist    3. Pure hypercholesterolemia  Continue pravastatin. LDL [id number]    4.  Essential hypertension  Losartan- HCTZ. 5. Overweight (BMI 25.0-29. 9)  Diet, exercise. 6. CKD stage 3 ([facility] [address]  Follows with Urologist.  On losartan  Creatinine 1.2-1.3-1.1-1.2-1.3-1.2-1.3-1.2-1.4-1.5  GFR [id number]    7. Nephropathy  Microalbumin creatinine ratio 197-80.3-53.6-65.8-103.9-38.8-38.4  Continue losartan    Reviewed and/or ordered clinical lab results Yes  Reviewed and/or ordered radiology tests No  Reviewed and/or ordered other diagnostic tests No  Discussed test results with performing physician No  Independently reviewed image, tracing, or specimen No  Made a decision to obtain old records No  Reviewed and summarized old records Yes  Hemoglobin A1c 8.5  Obtained history from other than patient No    General Brandy was counseled regarding symptoms of diabetes diagnosis, course and complications of disease if inadequately treated, side effects of medications, diagnosis, treatment options, and prognosis, risks, benefits, complications, and alternatives of treatment, labs, imaging and other studies and treatment targets and goals. He understands instructions and counseling. Return in about 3 months (around 12/29/2022) for diabetes.

## 2023-01-17 DIAGNOSIS — E11.3293 MILD NONPROLIFERATIVE DIABETIC RETINOPATHY OF BOTH EYES WITHOUT MACULAR EDEMA ASSOCIATED WITH TYPE 2 DIABETES MELLITUS (HCC): ICD-10-CM

## 2023-01-17 DIAGNOSIS — I10 ESSENTIAL HYPERTENSION: ICD-10-CM

## 2023-01-17 DIAGNOSIS — E78.00 PURE HYPERCHOLESTEROLEMIA: ICD-10-CM

## 2023-01-17 LAB
A/G RATIO: 1.4 (ref 1.1–2.2)
ALBUMIN SERPL-MCNC: 4.3 G/DL (ref 3.4–5)
ALP BLD-CCNC: 65 U/L (ref 40–129)
ALT SERPL-CCNC: 16 U/L (ref 10–40)
ANION GAP SERPL CALCULATED.3IONS-SCNC: 13 MMOL/L (ref 3–16)
AST SERPL-CCNC: 18 U/L (ref 15–37)
BILIRUB SERPL-MCNC: 0.5 MG/DL (ref 0–1)
BUN BLDV-MCNC: 36 MG/DL (ref 7–20)
CALCIUM SERPL-MCNC: 10.3 MG/DL (ref 8.3–10.6)
CHLORIDE BLD-SCNC: 100 MMOL/L (ref 99–110)
CHOLESTEROL, FASTING: 128 MG/DL (ref 0–199)
CO2: 23 MMOL/L (ref 21–32)
CREAT SERPL-MCNC: 1.5 MG/DL (ref 0.8–1.3)
CREATININE URINE: 71.5 MG/DL (ref 39–259)
GFR SERPL CREATININE-BSD FRML MDRD: 48 ML/MIN/{1.73_M2}
GLUCOSE BLD-MCNC: 113 MG/DL (ref 70–99)
HDLC SERPL-MCNC: 54 MG/DL (ref 40–60)
LDL CHOLESTEROL CALCULATED: 56 MG/DL
MICROALBUMIN UR-MCNC: 2.3 MG/DL
MICROALBUMIN/CREAT UR-RTO: 32.2 MG/G (ref 0–30)
POTASSIUM SERPL-SCNC: 4.8 MMOL/L (ref 3.5–5.1)
SODIUM BLD-SCNC: 136 MMOL/L (ref 136–145)
TOTAL PROTEIN: 7.4 G/DL (ref 6.4–8.2)
TRIGLYCERIDE, FASTING: 90 MG/DL (ref 0–150)
VLDLC SERPL CALC-MCNC: 18 MG/DL

## 2023-01-18 LAB
ESTIMATED AVERAGE GLUCOSE: 191.5 MG/DL
HBA1C MFR BLD: 8.3 %

## 2023-01-19 ENCOUNTER — OFFICE VISIT (OUTPATIENT)
Dept: ENDOCRINOLOGY | Age: 78
End: 2023-01-19
Payer: MEDICARE

## 2023-01-19 VITALS
TEMPERATURE: 98 F | HEIGHT: 72 IN | OXYGEN SATURATION: 98 % | BODY MASS INDEX: 27.77 KG/M2 | DIASTOLIC BLOOD PRESSURE: 68 MMHG | HEART RATE: 82 BPM | RESPIRATION RATE: 14 BRPM | WEIGHT: 205 LBS | SYSTOLIC BLOOD PRESSURE: 124 MMHG

## 2023-01-19 DIAGNOSIS — E11.40 POORLY CONTROLLED TYPE 2 DIABETES MELLITUS WITH NEUROPATHY (HCC): Primary | ICD-10-CM

## 2023-01-19 DIAGNOSIS — E66.3 OVERWEIGHT (BMI 25.0-29.9): ICD-10-CM

## 2023-01-19 DIAGNOSIS — E11.65 POORLY CONTROLLED TYPE 2 DIABETES MELLITUS WITH NEUROPATHY (HCC): Primary | ICD-10-CM

## 2023-01-19 DIAGNOSIS — E78.00 PURE HYPERCHOLESTEROLEMIA: ICD-10-CM

## 2023-01-19 DIAGNOSIS — E11.21 DIABETIC NEPHROPATHY ASSOCIATED WITH TYPE 2 DIABETES MELLITUS (HCC): ICD-10-CM

## 2023-01-19 DIAGNOSIS — N18.30 STAGE 3 CHRONIC KIDNEY DISEASE, UNSPECIFIED WHETHER STAGE 3A OR 3B CKD (HCC): ICD-10-CM

## 2023-01-19 DIAGNOSIS — E11.3293 MILD NONPROLIFERATIVE DIABETIC RETINOPATHY OF BOTH EYES WITHOUT MACULAR EDEMA ASSOCIATED WITH TYPE 2 DIABETES MELLITUS (HCC): ICD-10-CM

## 2023-01-19 DIAGNOSIS — I10 ESSENTIAL HYPERTENSION: ICD-10-CM

## 2023-01-19 PROCEDURE — 99214 OFFICE O/P EST MOD 30 MIN: CPT | Performed by: INTERNAL MEDICINE

## 2023-01-19 PROCEDURE — 3074F SYST BP LT 130 MM HG: CPT | Performed by: INTERNAL MEDICINE

## 2023-01-19 PROCEDURE — 3078F DIAST BP <80 MM HG: CPT | Performed by: INTERNAL MEDICINE

## 2023-01-19 PROCEDURE — 1123F ACP DISCUSS/DSCN MKR DOCD: CPT | Performed by: INTERNAL MEDICINE

## 2023-01-19 PROCEDURE — 3052F HG A1C>EQUAL 8.0%<EQUAL 9.0%: CPT | Performed by: INTERNAL MEDICINE

## 2023-01-19 RX ORDER — GLIPIZIDE AND METFORMIN HCL 5; 500 MG/1; MG/1
TABLET, FILM COATED ORAL
Qty: 360 TABLET | Refills: 1 | Status: SHIPPED | OUTPATIENT
Start: 2023-01-19

## 2023-01-19 RX ORDER — ATORVASTATIN CALCIUM 40 MG/1
TABLET, FILM COATED ORAL
Qty: 90 TABLET | Refills: 1 | Status: SHIPPED | OUTPATIENT
Start: 2023-01-19

## 2023-01-19 RX ORDER — LOSARTAN POTASSIUM AND HYDROCHLOROTHIAZIDE 12.5; 1 MG/1; MG/1
TABLET ORAL
Qty: 90 TABLET | Refills: 1 | Status: SHIPPED | OUTPATIENT
Start: 2023-01-19

## 2023-01-19 RX ORDER — INSULIN GLARGINE 100 [IU]/ML
INJECTION, SOLUTION SUBCUTANEOUS
Qty: 45 ML | Refills: 2 | Status: SHIPPED | OUTPATIENT
Start: 2023-01-19

## 2023-01-19 RX ORDER — PIOGLITAZONEHYDROCHLORIDE 15 MG/1
TABLET ORAL
Qty: 90 TABLET | Refills: 1 | Status: SHIPPED | OUTPATIENT
Start: 2023-01-19

## 2023-01-19 NOTE — PROGRESS NOTES
Rajesh Serrano is a 77 y.o. male who presents for Type 2 diabetes mellitus.     Current symptoms/problems include  hyperglycemia  and are worsening.     1.  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     Comorbid conditions:  hyperlipidemia, Neuropathy and Retinopathy    Current diabetic medications include: metaglip 5/500 mg 2 tabs bid, pioglitazone 15 mg qd, Lantus 30 units qhs  Has a lot of stress    Intolerance to diabetes medications: Trulicity     Weight trend: stable  Prior visit with dietician: yes  Current diet: on average, 2 meals per day, eats moderately healthy  Current exercise: walks     Current monitoring regimen: home blood tests - 1 times daily  Has brought blood glucose log/meter:  Yes  Home blood sugar records: fasting range:  and postprandial range:    Any episodes of hypoglycemia? No  Hypoglycemia frequency and time(s):    Does patient have Glucagon emergency kit? No  Does patient have rapid acting carbohydrate?  Yes  Does patient wear a medic alert bracelet or necklace?  No    2. 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    3. Pure hypercholesterolemia  No muscle pain.    4. Essential hypertension  No headaches.    5. Overweight (BMI 25.0-29.9)  Eats moderately healthy.  Walks.    6. CKD stage 3 secondary to diabetes (HCC)  Has prostate problems, see Urologist.    7. Nephropathy  Has urination problems      Past Medical History:   Diagnosis Date    Elevated prostate specific antigen (PSA)     Esophageal reflux     Other and unspecified hyperlipidemia     Screening PSA (prostate specific antigen) 9/17/2009    2.9    Type 2 diabetes mellitus, uncontrolled     Dr. 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 9)    Chronic kidney disease, stage 3    Urinary frequency    Age-related cataract    Poorly controlled type 2 diabetes mellitus with neuropathy ([facility] [address]    Diabetic nephropathy associated with type 2 diabetes mellitus ([facility] [address]     Past Surgical History:   Procedure Laterality Date    CATARACT REMOVAL WITH IMPLANT      COLONOSCOPY  8/5/2009    Normal-more[place] 2012-14    HERNIA REPAIR      TONSILLECTOMY       Social History     Socioeconomic History    Marital status:      Spouse name: Not on file    Number of children: Not on file    Years of education: Not on file    Highest education level: Not on file   Occupational History    Not on file   Tobacco Use    Smoking status: Never    Smokeless tobacco: Never   Vaping Use    Vaping Use: Never used   Substance and Sexual Activity    Alcohol use: No    Drug use: No    Sexual activity: Yes     Partners: Female   Other Topics Concern    Not on file   Social History Narrative    Not on file     Social Determinants of Health     Financial Resource Strain: Not on file   Food Insecurity: Not on file   Transportation Needs: Not on file   Physical Activity: Not on file   Stress: Not on file   Social Connections: Not on file   Intimate Partner Violence: Not on file   Housing Stability: Not on file     Family History   Problem Relation Age of Onset    Colon Cancer Father     Hypertension Mother      Current Outpatient Medications   Medication Sig Dispense Refill    dapagliflozin (FARXIGA) 10 MG tablet Take 1 tablet by mouth every morning 90 tablet 1    atorvastatin (LIPITOR) 40 MG tablet TAKE 1 TABLET DAILY (DISCONTINUE PRAVACHOL) 90 tablet 1    glipiZIDE-metFORMIN (METAGLIP) 5-500 MG per tablet TAKE 2 TABLETS TWICE A DAY BEFORE MEALS 360 tablet 1    insulin glargine (LANTUS SOLOSTAR) 100 UNIT/ML injection pen INJECT 30 UNITS SUBQ QHS 45 mL 2    losartan-hydroCHLOROthiazide (HYZAAR) 100-12.5 MG per tablet TAKE 1 TABLET DAILY 90 tablet 1    pioglitazone (ACTOS) 15 MG tablet TAKE 1 TABLET DAILY 90 tablet 1    ONETOUCH ULTRA strip USE ONE STRIP TO TEST FOUR TIMES A  strip 3    Insulin Pen Needle 32G X 6 MM MISC Use to Monitor  each 3    Krill Oil 500 MG CAPS Take by mouth      amLODIPine (NORVASC) 5 MG tablet TAKE 1 TABLET DAILY 90 tablet 3    omeprazole (PRILOSEC) 20 MG delayed release capsule TAKE 1 CAPSULE DAILY (Patient taking differently: Take 10 mg by mouth Daily) 90 capsule 3    tamsulosin (FLOMAX) 0.4 MG capsule       aspirin 81 MG tablet Take 81 mg by mouth daily      Multiple Vitamins-Minerals (CENTRUM SILVER PO) Take by mouth      cetirizine (ZYRTEC) 10 MG tablet Take 10 mg by mouth daily      Omega-3 Fatty Acids (FISH OIL) 1000 MG CAPS Take 1,000 mg by mouth daily        No current facility-administered medications for this visit.      Allergies   Allergen Reactions    Lisinopril Swelling     Neck swelling w ace     Family Status   Relation Name Status    Father   at age 62        Colon CA    Mother   at age 80        born [birth year]-\"old age\"    Brother  [de-identified]    Brother  Alive    Sister  Alive    Sister  Alive    Sister  Alive    [address] [facility]          DM       Lab Review:    Lab Results   Component Value Date/Time    WBC 6.5 2021 10:15 AM    HGB 11.6 2021 10:15 AM    HCT 34.4 2021 10:15 AM    MCV 90.5 2021 10:15 AM     2021 10:15 AM     Lab Results   Component Value Date/Time     2023 10:41 AM    K 4.8 01/17/2023 10:41 AM     01/17/2023 10:41 AM    CO2 23 01/17/2023 10:41 AM    BUN 36 01/17/2023 10:41 AM    CREATININE 1.5 01/17/2023 10:41 AM    GLUCOSE 113 01/17/2023 10:41 AM    CALCIUM 10.3 01/17/2023 10:41 AM    PROT 7.4 01/17/2023 10:41 AM    PROT 6.9 11/08/2010 09:13 AM    LABALBU 4.3 01/17/2023 10:41 AM    BILITOT 0.5 01/17/2023 10:41 AM    ALKPHOS 65 01/17/2023 10:41 AM    AST 18 01/17/2023 10:41 AM    ALT 16 01/17/2023 10:41 AM    LABGLOM 48 01/17/2023 10:41 AM    GFRAA 55 09/27/2022 08:46 AM    AGRATIO 1.4 01/17/2023 10:41 AM    GLOB 2.7 08/10/2021 11:18 AM     Lab Results   Component Value Date/Time    TSH 1.49 09/27/2022 08:46 AM     Lab Results   Component Value Date/Time    LABA1C 8.3 01/17/2023 10:41 AM     Lab Results   Component Value Date/Time    .5 01/17/2023 10:41 AM     Lab Results   Component Value Date/Time    CHOL 134 09/27/2022 08:46 AM     Lab Results   Component Value Date/Time    TRIG 100 09/27/2022 08:46 AM     Lab Results   Component Value Date/Time    HDL 54 01/17/2023 10:41 AM     Lab Results   Component Value Date/Time    LDLCALC 56 01/17/2023 10:41 AM     Lab Results   Component Value Date/Time    LABVLDL 18 01/17/2023 10:41 AM     Lab Results   Component Value Date/Time    CHOLHDLRATIO 2.9 11/08/2010 09:13 AM     Lab Results   Component Value Date/Time    LABMICR 2.30 01/17/2023 11:06 AM     Lab Results   Component Value Date/Time    VITD25 37 11/08/2010 09:13 AM        Review of Systems:  Constitutional: no fatigue, no fever, no recent weight gain, no recent weight loss, no changes in appetite  Eyes: no eye pain, no change in vision, no eye redness, no eye irritation, no double vision  Ears, nose, throat: no sore throat, no earache, no decrease in hearing, no hoarseness, no dry mouth, has sinus problems, no difficulty swallowing, no neck lumps, no dental problems, no mouth sores, no ringing in ears  Pulmonary: no shortness of breath, no wheezing, no dyspnea on exertion, no cough  Cardiovascular: no chest pain, no lower extremity edema, no orthopnea, no intermittent leg claudication, no palpitations  Gastrointestinal: no abdominal pain, no nausea, no vomiting, no diarrhea, no constipation, no dysphagia, has heartburn, no bloating  Genitourinary: no dysuria, no urinary incontinence, no urinary hesitancy, no urinary frequency, no feelings of urinary urgency, has nocturia  Musculoskeletal: no joint swelling, no joint stiffness, has joint pain, no muscle cramps, no muscle pain  Integument/Breast: no skin rashes, no skin lesions, no itching, no dry skin  Neurological: no numbness, no tingling, no weakness, no confusion, no headaches, no dizziness, no fainting, no tremors, no decrease in memory, no balance problems  Psychiatric: no anxiety, no depression, no insomnia  Hematologic/Lymphatic: no tendency for easy bleeding, no swollen lymph nodes, no tendency for easy bruising  Immunology: has seasonal allergies, no frequent infections, no frequent illnesses  Endocrine: no temperature intolerance    /68   Pulse 82   Temp 98 °F (36.7 °C)   Resp 14   Ht 6' (1.829 m)   Wt 205 lb (93 kg)   SpO2 98%   BMI 27.80 kg/m²    Wt Readings from Last 3 Encounters:   01/19/23 205 lb (93 kg)   09/29/22 210 lb (95.3 kg)   06/23/22 214 lb (97.1 kg)     Body mass index is 27.8 kg/m².       OBJECTIVE:  Constitutional: no acute distress, well appearing and well nourished  Psychiatric: oriented to person, place and time, judgement and insight and normal, recent and remote memory and intact and mood and affect are normal  Skin: skin and subcutaneous tissue is normal without mass, normal turgor  Head and Face: examination of head and face revealed no abnormalities  Eyes: no lid or conjunctival swelling, erythema or discharge, pupils are normal, equal, round, reactive to light  Ears/Nose: external inspection of ears and nose revealed no abnormalities, hearing is grossly normal  Oropharynx/Mouth/Face: lips, tongue and gums are normal with no lesions, the voice quality was normal  Neck: neck is supple and symmetric, with midline trachea and no masses, thyroid is normal  Lymphatics: normal cervical lymph nodes, normal supraclavicular nodes  Pulmonary: no increased work of breathing or signs of respiratory distress, lungs are clear to auscultation  Cardiovascular: normal heart rate and rhythm, normal S1 and S2, no murmurs and pedal pulses and 2+ bilaterally, No edema  Abdomen: abdomen is soft, non-tender with no masses  Musculoskeletal: normal gait and station and exam of the digits and nails are normal  Neurological: normal coordination and normal general cortical function    Visual inspection:  Deformity/amputation: absent  Skin lesions/pre-ulcerative calluses: present calluses  Edema: right- negative, left- negative     Sensory exam:  Monofilament sensation: normal  (minimum of 5 random plantar locations tested, avoiding callused areas - > 1 area with absence of sensation is + for neuropathy)     Plus at least one of the following:  Pulses: normal  Proprioception: Intact  Vibration (128 Hz): Impaired    ASSESSMENT/PLAN:  1. DM type 2, poorly controlled, with neuropathy (HCC)  Refused insulin. Advised prandial dose with dinner  Lantus 30 units qhs  Suggested prandial insulin, but patient refuses at this time. Farxiga 10 mg daily  He will continue more diet and exercise  Check 4 times daily  HbA1C 8.4-7.7-8.2-7.4-8.4-8.3-9.3-7.8-8.1-8.9-9.1-7.9-8.3  Diet, exercise. Hx of pancreatitis, does not want GLP-1 analogs  - Metaglip   - Pioglitazone  - Comprehensive Metabolic Panel; Future  - Hemoglobin A1C; Future  - Lipid Panel; Future  - Microalbumin / Creatinine Urine Ratio; Future    2. Mild nonproliferative diabetic retinopathy without macular edema associated with type 2 diabetes mellitus, unspecified laterality ([address]  Follow with Ophthalmologist    3.  Pure hypercholesterolemia  Continue pravastatin. LDL [id number]    4. Essential hypertension  Losartan- HCTZ. 5. Overweight (BMI 25.0-29. 9)  Diet, exercise. 6. CKD stage 3 ([address]  Follows with Urologist.  On losartan  Creatinine 1.2-1.3-1.1-1.2-1.3-1.2-1.3-1.2-1.4-1.5  GFR 20-86-08-53-35-43-54-59-49-46    7. Nephropathy  Microalbumin creatinine ratio 197-80.3-53.6-65.8-103.9-38.8-38.4  Continue losartan    Reviewed and/or ordered clinical lab results Yes  Reviewed and/or ordered radiology tests No  Reviewed and/or ordered other diagnostic tests No  Discussed test results with performing physician No  Independently reviewed image, tracing, or specimen No  Made a decision to obtain old records No  Reviewed and summarized old records Yes  Hemoglobin A1c 8.5  Obtained history from other than patient No    Grace Sanchez was counseled regarding symptoms of diabetes diagnosis, course and complications of disease if inadequately treated, side effects of medications, diagnosis, treatment options, and prognosis, risks, benefits, complications, and alternatives of treatment, labs, imaging and other studies and treatment targets and goals. He understands instructions and counseling. Return in about 3 months (around 4/19/2023) for diabetes.

## 2023-02-22 ENCOUNTER — TELEPHONE (OUTPATIENT)
Dept: ENDOCRINOLOGY | Age: 78
End: 2023-02-22

## 2023-02-22 DIAGNOSIS — E11.65 POORLY CONTROLLED TYPE 2 DIABETES MELLITUS WITH NEUROPATHY (HCC): ICD-10-CM

## 2023-02-22 DIAGNOSIS — E11.3293 MILD NONPROLIFERATIVE DIABETIC RETINOPATHY OF BOTH EYES WITHOUT MACULAR EDEMA ASSOCIATED WITH TYPE 2 DIABETES MELLITUS (HCC): ICD-10-CM

## 2023-02-22 DIAGNOSIS — I10 ESSENTIAL HYPERTENSION: ICD-10-CM

## 2023-02-22 DIAGNOSIS — E11.40 POORLY CONTROLLED TYPE 2 DIABETES MELLITUS WITH NEUROPATHY (HCC): ICD-10-CM

## 2023-02-22 DIAGNOSIS — E78.00 PURE HYPERCHOLESTEROLEMIA: ICD-10-CM

## 2023-02-22 RX ORDER — BLOOD SUGAR DIAGNOSTIC
1 STRIP MISCELLANEOUS 4 TIMES DAILY
Qty: 100 EACH | Refills: 0 | Status: SHIPPED | OUTPATIENT
Start: 2023-02-22

## 2023-02-22 RX ORDER — BLOOD SUGAR DIAGNOSTIC
STRIP MISCELLANEOUS
Qty: 400 STRIP | Refills: 3 | Status: SHIPPED | OUTPATIENT
Start: 2023-02-22

## 2023-02-22 NOTE — TELEPHONE ENCOUNTER
Pt is out of test strips, he needs a small supply sent to [facility] and then [facility] full [facility]     [facility]   Address: [address] [place] [address], Miki Ferrera [address]  Phone: [phone]    He is testing 4 times a day and wants a 3 month supply    [facility] ULTRA strip       [address], [address] [place] [address] [phone] - f [phone]   [facility] [id number]   Phone:  [phone]  Fax:  [phone]

## 2023-05-17 DIAGNOSIS — E11.21 DIABETIC NEPHROPATHY ASSOCIATED WITH TYPE 2 DIABETES MELLITUS (HCC): ICD-10-CM

## 2023-05-17 DIAGNOSIS — E78.00 PURE HYPERCHOLESTEROLEMIA: ICD-10-CM

## 2023-05-17 DIAGNOSIS — N18.30 STAGE 3 CHRONIC KIDNEY DISEASE, UNSPECIFIED WHETHER STAGE 3A OR 3B CKD (HCC): ICD-10-CM

## 2023-05-17 DIAGNOSIS — E11.65 POORLY CONTROLLED TYPE 2 DIABETES MELLITUS WITH NEUROPATHY (HCC): ICD-10-CM

## 2023-05-17 DIAGNOSIS — E11.3293 MILD NONPROLIFERATIVE DIABETIC RETINOPATHY OF BOTH EYES WITHOUT MACULAR EDEMA ASSOCIATED WITH TYPE 2 DIABETES MELLITUS (HCC): ICD-10-CM

## 2023-05-17 DIAGNOSIS — E11.40 POORLY CONTROLLED TYPE 2 DIABETES MELLITUS WITH NEUROPATHY (HCC): ICD-10-CM

## 2023-05-17 DIAGNOSIS — I10 ESSENTIAL HYPERTENSION: ICD-10-CM

## 2023-05-17 LAB
25(OH)D3 SERPL-MCNC: 32.4 NG/ML
ALBUMIN SERPL-MCNC: 4.5 G/DL (ref 3.4–5)
ALBUMIN/GLOB SERPL: 1.7 {RATIO} (ref 1.1–2.2)
ALP SERPL-CCNC: 63 U/L (ref 40–129)
ALT SERPL-CCNC: 14 U/L (ref 10–40)
ANION GAP SERPL CALCULATED.3IONS-SCNC: 13 MMOL/L (ref 3–16)
AST SERPL-CCNC: 16 U/L (ref 15–37)
BILIRUB SERPL-MCNC: 0.5 MG/DL (ref 0–1)
BUN SERPL-MCNC: 31 MG/DL (ref 7–20)
CALCIUM SERPL-MCNC: 9.6 MG/DL (ref 8.3–10.6)
CHLORIDE SERPL-SCNC: 103 MMOL/L (ref 99–110)
CHOLEST SERPL-MCNC: 130 MG/DL (ref 0–199)
CO2 SERPL-SCNC: 25 MMOL/L (ref 21–32)
CREAT SERPL-MCNC: 1.4 MG/DL (ref 0.8–1.3)
CREAT UR-MCNC: 67.9 MG/DL (ref 39–259)
EST. AVERAGE GLUCOSE BLD GHB EST-MCNC: 197.3 MG/DL
GFR SERPLBLD CREATININE-BSD FMLA CKD-EPI: 52 ML/MIN/{1.73_M2}
GLUCOSE SERPL-MCNC: 79 MG/DL (ref 70–99)
HBA1C MFR BLD: 8.5 %
HDLC SERPL-MCNC: 58 MG/DL (ref 40–60)
LDL CHOLESTEROL CALCULATED: 59 MG/DL
MICROALBUMIN UR DL<=1MG/L-MCNC: 3.7 MG/DL
MICROALBUMIN/CREAT UR: 54.5 MG/G (ref 0–30)
POTASSIUM SERPL-SCNC: 4.5 MMOL/L (ref 3.5–5.1)
PROT SERPL-MCNC: 7.2 G/DL (ref 6.4–8.2)
SODIUM SERPL-SCNC: 141 MMOL/L (ref 136–145)
T3FREE SERPL-MCNC: 2.7 PG/ML (ref 2.3–4.2)
T4 FREE SERPL-MCNC: 1.5 NG/DL (ref 0.9–1.8)
TRIGL SERPL-MCNC: 63 MG/DL (ref 0–150)
TSH SERPL DL<=0.005 MIU/L-ACNC: 1.45 UIU/ML (ref 0.27–4.2)
VLDLC SERPL CALC-MCNC: 13 MG/DL

## 2023-05-18 ENCOUNTER — OFFICE VISIT (OUTPATIENT)
Dept: ENDOCRINOLOGY | Age: 78
End: 2023-05-18
Payer: MEDICARE

## 2023-05-18 VITALS
TEMPERATURE: 98 F | WEIGHT: 205 LBS | DIASTOLIC BLOOD PRESSURE: 69 MMHG | BODY MASS INDEX: 27.77 KG/M2 | HEART RATE: 60 BPM | OXYGEN SATURATION: 98 % | RESPIRATION RATE: 14 BRPM | SYSTOLIC BLOOD PRESSURE: 137 MMHG | HEIGHT: 72 IN

## 2023-05-18 DIAGNOSIS — E66.3 OVERWEIGHT (BMI 25.0-29.9): ICD-10-CM

## 2023-05-18 DIAGNOSIS — E11.40 POORLY CONTROLLED TYPE 2 DIABETES MELLITUS WITH NEUROPATHY (HCC): Primary | ICD-10-CM

## 2023-05-18 DIAGNOSIS — I10 ESSENTIAL HYPERTENSION: ICD-10-CM

## 2023-05-18 DIAGNOSIS — E11.21 DIABETIC NEPHROPATHY ASSOCIATED WITH TYPE 2 DIABETES MELLITUS (HCC): ICD-10-CM

## 2023-05-18 DIAGNOSIS — E78.00 PURE HYPERCHOLESTEROLEMIA: ICD-10-CM

## 2023-05-18 DIAGNOSIS — E11.3293 MILD NONPROLIFERATIVE DIABETIC RETINOPATHY OF BOTH EYES WITHOUT MACULAR EDEMA ASSOCIATED WITH TYPE 2 DIABETES MELLITUS (HCC): ICD-10-CM

## 2023-05-18 DIAGNOSIS — N18.30 STAGE 3 CHRONIC KIDNEY DISEASE, UNSPECIFIED WHETHER STAGE 3A OR 3B CKD (HCC): ICD-10-CM

## 2023-05-18 DIAGNOSIS — E11.65 POORLY CONTROLLED TYPE 2 DIABETES MELLITUS WITH NEUROPATHY (HCC): Primary | ICD-10-CM

## 2023-05-18 PROCEDURE — 99214 OFFICE O/P EST MOD 30 MIN: CPT | Performed by: INTERNAL MEDICINE

## 2023-05-18 PROCEDURE — 3075F SYST BP GE 130 - 139MM HG: CPT | Performed by: INTERNAL MEDICINE

## 2023-05-18 PROCEDURE — 3052F HG A1C>EQUAL 8.0%<EQUAL 9.0%: CPT | Performed by: INTERNAL MEDICINE

## 2023-05-18 PROCEDURE — 1123F ACP DISCUSS/DSCN MKR DOCD: CPT | Performed by: INTERNAL MEDICINE

## 2023-05-18 PROCEDURE — 3078F DIAST BP <80 MM HG: CPT | Performed by: INTERNAL MEDICINE

## 2023-05-18 RX ORDER — ATORVASTATIN CALCIUM 40 MG/1
TABLET, FILM COATED ORAL
Qty: 90 TABLET | Refills: 1 | Status: SHIPPED | OUTPATIENT
Start: 2023-05-18

## 2023-05-18 RX ORDER — BLOOD SUGAR DIAGNOSTIC
1 STRIP MISCELLANEOUS 3 TIMES DAILY
Qty: 300 EACH | Refills: 3
Start: 2023-05-18

## 2023-05-18 RX ORDER — GLIPIZIDE AND METFORMIN HCL 5; 500 MG/1; MG/1
TABLET, FILM COATED ORAL
Qty: 360 TABLET | Refills: 1 | Status: SHIPPED | OUTPATIENT
Start: 2023-05-18

## 2023-05-18 RX ORDER — PIOGLITAZONEHYDROCHLORIDE 15 MG/1
TABLET ORAL
Qty: 90 TABLET | Refills: 1 | Status: SHIPPED | OUTPATIENT
Start: 2023-05-18

## 2023-05-18 RX ORDER — LOSARTAN POTASSIUM AND HYDROCHLOROTHIAZIDE 12.5; 1 MG/1; MG/1
TABLET ORAL
Qty: 90 TABLET | Refills: 1 | Status: SHIPPED | OUTPATIENT
Start: 2023-05-18

## 2023-05-18 RX ORDER — LANCETS
1 EACH MISCELLANEOUS 3 TIMES DAILY
Qty: 300 EACH | Refills: 3 | Status: SHIPPED | OUTPATIENT
Start: 2023-05-18

## 2023-05-18 RX ORDER — INSULIN GLARGINE 100 [IU]/ML
INJECTION, SOLUTION SUBCUTANEOUS
Qty: 45 ML | Refills: 2 | Status: SHIPPED | OUTPATIENT
Start: 2023-05-18

## 2023-05-18 NOTE — PROGRESS NOTES
difficulty swallowing, no neck lumps, no dental problems, no mouth sores, no ringing in ears  Pulmonary: no shortness of breath, no wheezing, no dyspnea on exertion, no cough  Cardiovascular: no chest pain, no lower extremity edema, no orthopnea, no intermittent leg claudication, no palpitations  Gastrointestinal: no abdominal pain, no nausea, no vomiting, no diarrhea, no constipation, no dysphagia, has heartburn, no bloating  Genitourinary: no dysuria, no urinary incontinence, no urinary hesitancy, no urinary frequency, no feelings of urinary urgency, has nocturia  Musculoskeletal: no joint swelling, no joint stiffness, has joint pain, no muscle cramps, no muscle pain  Integument/Breast: no skin rashes, no skin lesions, no itching, no dry skin  Neurological: no numbness, no tingling, no weakness, no confusion, no headaches, no dizziness, no fainting, no tremors, no decrease in memory, no balance problems  Psychiatric: no anxiety, no depression, no insomnia  Hematologic/Lymphatic: no tendency for easy bleeding, no swollen lymph nodes, no tendency for easy bruising  Immunology: has seasonal allergies, no frequent infections, no frequent illnesses  Endocrine: no temperature intolerance    /69   Pulse 60   Temp 98 °F (36.7 °C)   Resp 14   Ht 6' (1.829 m)   Wt 205 lb (93 kg)   SpO2 98%   BMI 27.80 kg/m²    Wt Readings from Last 3 Encounters:   05/18/23 205 lb (93 kg)   01/19/23 205 lb (93 kg)   09/29/22 210 lb (95.3 kg)     Body mass index is 27.8 kg/m².       OBJECTIVE:  Constitutional: no acute distress, well appearing and well nourished  Psychiatric: oriented to person, place and time, judgement and insight and normal, recent and remote memory and intact and mood and affect are normal  Skin: skin and subcutaneous tissue is normal without mass, normal turgor  Head and Face: examination of head and face revealed no abnormalities  Eyes: no lid or conjunctival swelling, erythema or discharge, pupils are

## 2023-05-20 ENCOUNTER — TELEPHONE (OUTPATIENT)
Dept: ENDOCRINOLOGY | Age: 78
End: 2023-05-20

## 2023-05-22 NOTE — TELEPHONE ENCOUNTER
Spoke w/ pt. He had a birthday over the weekend and did not want to start anything new yet. He is planning on starting it tonight. Pancreatitis was \"10-15 years ago or more. \"

## 2023-09-05 DIAGNOSIS — E11.3293 MILD NONPROLIFERATIVE DIABETIC RETINOPATHY OF BOTH EYES WITHOUT MACULAR EDEMA ASSOCIATED WITH TYPE 2 DIABETES MELLITUS (HCC): ICD-10-CM

## 2023-09-05 DIAGNOSIS — E11.21 DIABETIC NEPHROPATHY ASSOCIATED WITH TYPE 2 DIABETES MELLITUS (HCC): ICD-10-CM

## 2023-09-05 DIAGNOSIS — I10 ESSENTIAL HYPERTENSION: ICD-10-CM

## 2023-09-05 DIAGNOSIS — E11.40 POORLY CONTROLLED TYPE 2 DIABETES MELLITUS WITH NEUROPATHY (HCC): ICD-10-CM

## 2023-09-05 DIAGNOSIS — E78.00 PURE HYPERCHOLESTEROLEMIA: ICD-10-CM

## 2023-09-05 DIAGNOSIS — E11.65 POORLY CONTROLLED TYPE 2 DIABETES MELLITUS WITH NEUROPATHY (HCC): ICD-10-CM

## 2023-09-05 LAB
ALBUMIN SERPL-MCNC: 4.4 G/DL (ref 3.4–5)
ALBUMIN/GLOB SERPL: 1.5 {RATIO} (ref 1.1–2.2)
ALP SERPL-CCNC: 59 U/L (ref 40–129)
ALT SERPL-CCNC: 12 U/L (ref 10–40)
ANION GAP SERPL CALCULATED.3IONS-SCNC: 13 MMOL/L (ref 3–16)
AST SERPL-CCNC: 14 U/L (ref 15–37)
BILIRUB SERPL-MCNC: 0.5 MG/DL (ref 0–1)
BUN SERPL-MCNC: 43 MG/DL (ref 7–20)
CALCIUM SERPL-MCNC: 10.4 MG/DL (ref 8.3–10.6)
CHLORIDE SERPL-SCNC: 100 MMOL/L (ref 99–110)
CHOLEST SERPL-MCNC: 123 MG/DL (ref 0–199)
CO2 SERPL-SCNC: 25 MMOL/L (ref 21–32)
CREAT SERPL-MCNC: 1.8 MG/DL (ref 0.8–1.3)
CREAT UR-MCNC: 100.7 MG/DL (ref 39–259)
GFR SERPLBLD CREATININE-BSD FMLA CKD-EPI: 38 ML/MIN/{1.73_M2}
GLUCOSE SERPL-MCNC: 117 MG/DL (ref 70–99)
HDLC SERPL-MCNC: 60 MG/DL (ref 40–60)
LDL CHOLESTEROL CALCULATED: 45 MG/DL
MICROALBUMIN UR DL<=1MG/L-MCNC: 2.7 MG/DL
MICROALBUMIN/CREAT UR: 26.8 MG/G (ref 0–30)
POTASSIUM SERPL-SCNC: 4.8 MMOL/L (ref 3.5–5.1)
PROT SERPL-MCNC: 7.4 G/DL (ref 6.4–8.2)
SODIUM SERPL-SCNC: 138 MMOL/L (ref 136–145)
TRIGL SERPL-MCNC: 91 MG/DL (ref 0–150)
VLDLC SERPL CALC-MCNC: 18 MG/DL

## 2023-09-06 PROBLEM — E11.3293 MILD NONPROLIFERATIVE DIABETIC RETINOPATHY OF BOTH EYES WITHOUT MACULAR EDEMA ASSOCIATED WITH TYPE 2 DIABETES MELLITUS (HCC): Status: ACTIVE | Noted: 2023-09-06

## 2023-09-06 PROBLEM — N18.30 STAGE 3 CHRONIC KIDNEY DISEASE (HCC): Status: ACTIVE | Noted: 2023-09-06

## 2023-09-06 LAB
EST. AVERAGE GLUCOSE BLD GHB EST-MCNC: 185.8 MG/DL
HBA1C MFR BLD: 8.1 %

## 2023-09-07 ENCOUNTER — OFFICE VISIT (OUTPATIENT)
Dept: ENDOCRINOLOGY | Age: 78
End: 2023-09-07
Payer: MEDICARE

## 2023-09-07 VITALS
SYSTOLIC BLOOD PRESSURE: 125 MMHG | WEIGHT: 202 LBS | OXYGEN SATURATION: 99 % | RESPIRATION RATE: 14 BRPM | HEART RATE: 76 BPM | DIASTOLIC BLOOD PRESSURE: 60 MMHG | BODY MASS INDEX: 27.36 KG/M2 | TEMPERATURE: 98 F | HEIGHT: 72 IN

## 2023-09-07 DIAGNOSIS — E11.21 DIABETIC NEPHROPATHY ASSOCIATED WITH TYPE 2 DIABETES MELLITUS (HCC): ICD-10-CM

## 2023-09-07 DIAGNOSIS — E11.3293 MILD NONPROLIFERATIVE DIABETIC RETINOPATHY OF BOTH EYES WITHOUT MACULAR EDEMA ASSOCIATED WITH TYPE 2 DIABETES MELLITUS (HCC): ICD-10-CM

## 2023-09-07 DIAGNOSIS — E78.00 PURE HYPERCHOLESTEROLEMIA: ICD-10-CM

## 2023-09-07 DIAGNOSIS — E11.40 POORLY CONTROLLED TYPE 2 DIABETES MELLITUS WITH NEUROPATHY (HCC): Primary | ICD-10-CM

## 2023-09-07 DIAGNOSIS — E11.65 POORLY CONTROLLED TYPE 2 DIABETES MELLITUS WITH NEUROPATHY (HCC): Primary | ICD-10-CM

## 2023-09-07 DIAGNOSIS — E66.3 OVERWEIGHT (BMI 25.0-29.9): ICD-10-CM

## 2023-09-07 DIAGNOSIS — N18.30 STAGE 3 CHRONIC KIDNEY DISEASE, UNSPECIFIED WHETHER STAGE 3A OR 3B CKD (HCC): ICD-10-CM

## 2023-09-07 DIAGNOSIS — I10 ESSENTIAL HYPERTENSION: ICD-10-CM

## 2023-09-07 PROCEDURE — 3078F DIAST BP <80 MM HG: CPT | Performed by: INTERNAL MEDICINE

## 2023-09-07 PROCEDURE — 3052F HG A1C>EQUAL 8.0%<EQUAL 9.0%: CPT | Performed by: INTERNAL MEDICINE

## 2023-09-07 PROCEDURE — 1123F ACP DISCUSS/DSCN MKR DOCD: CPT | Performed by: INTERNAL MEDICINE

## 2023-09-07 PROCEDURE — 99214 OFFICE O/P EST MOD 30 MIN: CPT | Performed by: INTERNAL MEDICINE

## 2023-09-07 PROCEDURE — 3074F SYST BP LT 130 MM HG: CPT | Performed by: INTERNAL MEDICINE

## 2023-09-07 RX ORDER — ATORVASTATIN CALCIUM 40 MG/1
TABLET, FILM COATED ORAL
Qty: 90 TABLET | Refills: 1 | Status: SHIPPED | OUTPATIENT
Start: 2023-09-07

## 2023-09-07 RX ORDER — PIOGLITAZONEHYDROCHLORIDE 15 MG/1
TABLET ORAL
Qty: 90 TABLET | Refills: 1 | Status: SHIPPED | OUTPATIENT
Start: 2023-09-07

## 2023-09-07 RX ORDER — GLIPIZIDE AND METFORMIN HCL 5; 500 MG/1; MG/1
TABLET, FILM COATED ORAL
Qty: 360 TABLET | Refills: 1 | Status: SHIPPED | OUTPATIENT
Start: 2023-09-07

## 2023-09-07 RX ORDER — INSULIN GLARGINE 100 [IU]/ML
INJECTION, SOLUTION SUBCUTANEOUS
Qty: 45 ML | Refills: 2 | Status: CANCELLED | OUTPATIENT
Start: 2023-09-07

## 2023-09-07 RX ORDER — FAMOTIDINE 10 MG
10 TABLET ORAL DAILY
COMMUNITY

## 2023-09-07 RX ORDER — INSULIN GLARGINE AND LIXISENATIDE 100; 33 U/ML; UG/ML
30 INJECTION, SOLUTION SUBCUTANEOUS DAILY
Qty: 30 ML | Refills: 1 | Status: SHIPPED | OUTPATIENT
Start: 2023-09-07

## 2023-09-07 RX ORDER — LOSARTAN POTASSIUM AND HYDROCHLOROTHIAZIDE 12.5; 1 MG/1; MG/1
TABLET ORAL
Qty: 90 TABLET | Refills: 1 | Status: SHIPPED | OUTPATIENT
Start: 2023-09-07

## 2023-09-07 NOTE — PROGRESS NOTES
daily  HbA1C 8.4-7.7-8.2-7.4-8.4-8.3-9.3-7.8-8.1-8.9-9.1-7.9-8.3-8.5-8.1  Diet, exercise. Hx of pancreatitis, does not want GLP-1 analogs. OK to do [place], patient agrees. - Metaglip   - Pioglitazone  - Comprehensive Metabolic Panel; Future  - Hemoglobin A1C; Future  - Lipid Panel; Future  - Microalbumin / Creatinine Urine Ratio; Future    2. 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 Pure hypercholesterolemia  Continue pravastatin. LDL 79-82-44-75-25-17-44-20-99-71-51-37-67-25    4. Essential hypertension  Losartan- HCTZ. 5. Overweight (BMI 25.0-29. 9)  Diet, exercise. 6. CKD stage 3 ([address])  Worsening  Follows with Urologist.  On losartan  Creatinine 1.2-1.3-1.1-1.2-1.3-1.2-1.3-1.2-1.4-1.5-1.4-1.8  GFR 06-35-77-01-77-26-87-85-98-46-52-30    7. Nephropathy  Improved  Microalbumin creatinine ratio 197-80.3-53.6-65.8-103.9-38.8-38.4-26.8  Continue losartan    Reviewed and/or ordered clinical lab results Yes  Reviewed and/or ordered radiology tests No  Reviewed and/or ordered other diagnostic tests No  Discussed test results with performing physician No  Independently reviewed image, tracing, or specimen No  Made a decision to obtain old records No  Reviewed and summarized old records Yes  Hemoglobin A1c 8.5  Obtained history from other than patient No    Antonio Racer was counseled regarding symptoms of diabetes diagnosis, course and complications of disease if inadequately treated, side effects of medications, diagnosis, treatment options, and prognosis, risks, benefits, complications, and alternatives of treatment, labs, imaging and other studies and treatment targets and goals. He understands instructions and counseling. Return in about 3 months (around 12/7/2023) for diabetes.

## 2023-12-04 DIAGNOSIS — E11.65 POORLY CONTROLLED TYPE 2 DIABETES MELLITUS WITH NEUROPATHY (HCC): ICD-10-CM

## 2023-12-04 DIAGNOSIS — E78.00 PURE HYPERCHOLESTEROLEMIA: ICD-10-CM

## 2023-12-04 DIAGNOSIS — E11.40 POORLY CONTROLLED TYPE 2 DIABETES MELLITUS WITH NEUROPATHY (HCC): ICD-10-CM

## 2023-12-04 DIAGNOSIS — E11.21 DIABETIC NEPHROPATHY ASSOCIATED WITH TYPE 2 DIABETES MELLITUS (HCC): ICD-10-CM

## 2023-12-04 DIAGNOSIS — I10 ESSENTIAL HYPERTENSION: ICD-10-CM

## 2023-12-04 DIAGNOSIS — E11.3293 MILD NONPROLIFERATIVE DIABETIC RETINOPATHY OF BOTH EYES WITHOUT MACULAR EDEMA ASSOCIATED WITH TYPE 2 DIABETES MELLITUS (HCC): ICD-10-CM

## 2023-12-05 LAB
ALBUMIN SERPL-MCNC: 4.4 G/DL (ref 3.4–5)
ALBUMIN/GLOB SERPL: 1.5 {RATIO} (ref 1.1–2.2)
ALP SERPL-CCNC: 57 U/L (ref 40–129)
ALT SERPL-CCNC: 17 U/L (ref 10–40)
ANION GAP SERPL CALCULATED.3IONS-SCNC: 9 MMOL/L (ref 3–16)
AST SERPL-CCNC: 19 U/L (ref 15–37)
BILIRUB SERPL-MCNC: 0.5 MG/DL (ref 0–1)
BUN SERPL-MCNC: 33 MG/DL (ref 7–20)
CALCIUM SERPL-MCNC: 10.1 MG/DL (ref 8.3–10.6)
CHLORIDE SERPL-SCNC: 103 MMOL/L (ref 99–110)
CHOLEST SERPL-MCNC: 120 MG/DL (ref 0–199)
CO2 SERPL-SCNC: 27 MMOL/L (ref 21–32)
CREAT SERPL-MCNC: 1.5 MG/DL (ref 0.8–1.3)
CREAT UR-MCNC: 64.3 MG/DL (ref 39–259)
EST. AVERAGE GLUCOSE BLD GHB EST-MCNC: 191.5 MG/DL
GFR SERPLBLD CREATININE-BSD FMLA CKD-EPI: 48 ML/MIN/{1.73_M2}
GLUCOSE SERPL-MCNC: 141 MG/DL (ref 70–99)
HBA1C MFR BLD: 8.3 %
HDLC SERPL-MCNC: 59 MG/DL (ref 40–60)
LDL CHOLESTEROL CALCULATED: 44 MG/DL
MICROALBUMIN UR DL<=1MG/L-MCNC: 3 MG/DL
MICROALBUMIN/CREAT UR: 46.7 MG/G (ref 0–30)
POTASSIUM SERPL-SCNC: 4.9 MMOL/L (ref 3.5–5.1)
PROT SERPL-MCNC: 7.3 G/DL (ref 6.4–8.2)
SODIUM SERPL-SCNC: 139 MMOL/L (ref 136–145)
TRIGL SERPL-MCNC: 87 MG/DL (ref 0–150)
VLDLC SERPL CALC-MCNC: 17 MG/DL

## 2023-12-07 ENCOUNTER — OFFICE VISIT (OUTPATIENT)
Dept: ENDOCRINOLOGY | Age: 78
End: 2023-12-07
Payer: MEDICARE

## 2023-12-07 VITALS
DIASTOLIC BLOOD PRESSURE: 58 MMHG | HEIGHT: 72 IN | WEIGHT: 200 LBS | BODY MASS INDEX: 27.09 KG/M2 | RESPIRATION RATE: 14 BRPM | OXYGEN SATURATION: 99 % | HEART RATE: 79 BPM | TEMPERATURE: 98 F | SYSTOLIC BLOOD PRESSURE: 113 MMHG

## 2023-12-07 DIAGNOSIS — I10 ESSENTIAL HYPERTENSION: ICD-10-CM

## 2023-12-07 DIAGNOSIS — E11.3293 MILD NONPROLIFERATIVE DIABETIC RETINOPATHY OF BOTH EYES WITHOUT MACULAR EDEMA ASSOCIATED WITH TYPE 2 DIABETES MELLITUS (HCC): ICD-10-CM

## 2023-12-07 DIAGNOSIS — E11.65 POORLY CONTROLLED TYPE 2 DIABETES MELLITUS WITH NEUROPATHY (HCC): Primary | ICD-10-CM

## 2023-12-07 DIAGNOSIS — E66.3 OVERWEIGHT (BMI 25.0-29.9): ICD-10-CM

## 2023-12-07 DIAGNOSIS — N18.30 STAGE 3 CHRONIC KIDNEY DISEASE, UNSPECIFIED WHETHER STAGE 3A OR 3B CKD (HCC): ICD-10-CM

## 2023-12-07 DIAGNOSIS — E11.40 POORLY CONTROLLED TYPE 2 DIABETES MELLITUS WITH NEUROPATHY (HCC): Primary | ICD-10-CM

## 2023-12-07 DIAGNOSIS — E11.21 DIABETIC NEPHROPATHY ASSOCIATED WITH TYPE 2 DIABETES MELLITUS (HCC): ICD-10-CM

## 2023-12-07 DIAGNOSIS — E78.00 PURE HYPERCHOLESTEROLEMIA: ICD-10-CM

## 2023-12-07 PROCEDURE — 3078F DIAST BP <80 MM HG: CPT | Performed by: INTERNAL MEDICINE

## 2023-12-07 PROCEDURE — 3074F SYST BP LT 130 MM HG: CPT | Performed by: INTERNAL MEDICINE

## 2023-12-07 PROCEDURE — 99214 OFFICE O/P EST MOD 30 MIN: CPT | Performed by: INTERNAL MEDICINE

## 2023-12-07 PROCEDURE — 1123F ACP DISCUSS/DSCN MKR DOCD: CPT | Performed by: INTERNAL MEDICINE

## 2023-12-07 PROCEDURE — 3052F HG A1C>EQUAL 8.0%<EQUAL 9.0%: CPT | Performed by: INTERNAL MEDICINE

## 2023-12-07 RX ORDER — BLOOD SUGAR DIAGNOSTIC
1 STRIP MISCELLANEOUS 2 TIMES DAILY
Qty: 200 EACH | Refills: 3 | Status: SHIPPED | OUTPATIENT
Start: 2023-12-07

## 2023-12-07 RX ORDER — FERROUS SULFATE 325(65) MG
325 TABLET ORAL EVERY OTHER DAY
COMMUNITY

## 2023-12-07 RX ORDER — INSULIN GLARGINE AND LIXISENATIDE 100; 33 U/ML; UG/ML
30 INJECTION, SOLUTION SUBCUTANEOUS EVERY MORNING
Qty: 30 ML | Refills: 1 | Status: SHIPPED | OUTPATIENT
Start: 2023-12-07

## 2023-12-07 RX ORDER — ATORVASTATIN CALCIUM 40 MG/1
TABLET, FILM COATED ORAL
Qty: 90 TABLET | Refills: 1 | Status: SHIPPED | OUTPATIENT
Start: 2023-12-07

## 2023-12-07 RX ORDER — GLIPIZIDE AND METFORMIN HCL 5; 500 MG/1; MG/1
TABLET, FILM COATED ORAL
Qty: 360 TABLET | Refills: 1 | Status: SHIPPED | OUTPATIENT
Start: 2023-12-07

## 2023-12-07 NOTE — PROGRESS NOTES
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)    Diabetic nephropathy associated with type 2 diabetes mellitus (HCC)    Mild nonproliferative diabetic retinopathy of both eyes without macular edema associated with type 2 diabetes mellitus (HCC)    Stage 3 chronic kidney disease (HCC)     Past Surgical History:   Procedure Laterality Date    CATARACT REMOVAL WITH IMPLANT      COLONOSCOPY  8/5/2009    Normal-[place] 2012-14    HERNIA REPAIR      TONSILLECTOMY       Social History     Socioeconomic History    Marital status:      Spouse name: Not on file    Number of children: Not on file    Years of education: Not on file    Highest education level: Not on file   Occupational History    Not on file   Tobacco Use    Smoking status: Never    Smokeless tobacco: Never   Vaping Use    Vaping Use: Never used   Substance and Sexual Activity    Alcohol use: No    Drug use: No    Sexual activity: Yes     Partners: Female   Other Topics Concern    Not on file   Social History Narrative    Not on file     Social Determinants of Health     Financial Resource Strain: Not on file   Food Insecurity: Not on file   Transportation Needs: Not on file   Physical Activity: Not on file   Stress: Not on file   Social Connections: Not on file   Intimate Partner Violence: Not on file   Housing Stability: Not on file     Family

## 2024-03-05 DIAGNOSIS — I10 ESSENTIAL HYPERTENSION: ICD-10-CM

## 2024-03-05 DIAGNOSIS — E78.00 PURE HYPERCHOLESTEROLEMIA: ICD-10-CM

## 2024-03-05 DIAGNOSIS — E11.40 POORLY CONTROLLED TYPE 2 DIABETES MELLITUS WITH NEUROPATHY (HCC): ICD-10-CM

## 2024-03-05 DIAGNOSIS — E11.65 POORLY CONTROLLED TYPE 2 DIABETES MELLITUS WITH NEUROPATHY (HCC): ICD-10-CM

## 2024-03-05 LAB
ALBUMIN SERPL-MCNC: 4.3 G/DL (ref 3.4–5)
ALBUMIN/GLOB SERPL: 1.6 {RATIO} (ref 1.1–2.2)
ALP SERPL-CCNC: 52 U/L (ref 40–129)
ALT SERPL-CCNC: 12 U/L (ref 10–40)
ANION GAP SERPL CALCULATED.3IONS-SCNC: 10 MMOL/L (ref 3–16)
AST SERPL-CCNC: 15 U/L (ref 15–37)
BILIRUB SERPL-MCNC: 0.3 MG/DL (ref 0–1)
BUN SERPL-MCNC: 39 MG/DL (ref 7–20)
CALCIUM SERPL-MCNC: 10 MG/DL (ref 8.3–10.6)
CHLORIDE SERPL-SCNC: 104 MMOL/L (ref 99–110)
CHOLEST SERPL-MCNC: 115 MG/DL (ref 0–199)
CO2 SERPL-SCNC: 25 MMOL/L (ref 21–32)
CREAT SERPL-MCNC: 1.4 MG/DL (ref 0.8–1.3)
CREAT UR-MCNC: 78.5 MG/DL (ref 39–259)
GFR SERPLBLD CREATININE-BSD FMLA CKD-EPI: 52 ML/MIN/{1.73_M2}
GLUCOSE SERPL-MCNC: 69 MG/DL (ref 70–99)
HDLC SERPL-MCNC: 52 MG/DL (ref 40–60)
LDL CHOLESTEROL CALCULATED: 50 MG/DL
MICROALBUMIN UR DL<=1MG/L-MCNC: 1.9 MG/DL
MICROALBUMIN/CREAT UR: 24.2 MG/G (ref 0–30)
POTASSIUM SERPL-SCNC: 4.6 MMOL/L (ref 3.5–5.1)
PROT SERPL-MCNC: 7 G/DL (ref 6.4–8.2)
SODIUM SERPL-SCNC: 139 MMOL/L (ref 136–145)
TRIGL SERPL-MCNC: 67 MG/DL (ref 0–150)
VLDLC SERPL CALC-MCNC: 13 MG/DL

## 2024-03-06 LAB
EST. AVERAGE GLUCOSE BLD GHB EST-MCNC: 180 MG/DL
HBA1C MFR BLD: 7.9 %

## 2024-03-07 ENCOUNTER — OFFICE VISIT (OUTPATIENT)
Dept: ENDOCRINOLOGY | Age: 79
End: 2024-03-07
Payer: MEDICARE

## 2024-03-07 VITALS
SYSTOLIC BLOOD PRESSURE: 132 MMHG | TEMPERATURE: 98 F | OXYGEN SATURATION: 99 % | RESPIRATION RATE: 14 BRPM | BODY MASS INDEX: 27.5 KG/M2 | HEIGHT: 72 IN | DIASTOLIC BLOOD PRESSURE: 76 MMHG | HEART RATE: 74 BPM | WEIGHT: 203 LBS

## 2024-03-07 DIAGNOSIS — N18.30 STAGE 3 CHRONIC KIDNEY DISEASE, UNSPECIFIED WHETHER STAGE 3A OR 3B CKD (HCC): ICD-10-CM

## 2024-03-07 DIAGNOSIS — E78.00 PURE HYPERCHOLESTEROLEMIA: ICD-10-CM

## 2024-03-07 DIAGNOSIS — E11.65 POORLY CONTROLLED TYPE 2 DIABETES MELLITUS WITH NEUROPATHY (HCC): Primary | ICD-10-CM

## 2024-03-07 DIAGNOSIS — E11.40 POORLY CONTROLLED TYPE 2 DIABETES MELLITUS WITH NEUROPATHY (HCC): Primary | ICD-10-CM

## 2024-03-07 DIAGNOSIS — I10 ESSENTIAL HYPERTENSION: ICD-10-CM

## 2024-03-07 DIAGNOSIS — E11.3293 MILD NONPROLIFERATIVE DIABETIC RETINOPATHY OF BOTH EYES WITHOUT MACULAR EDEMA ASSOCIATED WITH TYPE 2 DIABETES MELLITUS (HCC): ICD-10-CM

## 2024-03-07 PROCEDURE — 3078F DIAST BP <80 MM HG: CPT | Performed by: INTERNAL MEDICINE

## 2024-03-07 PROCEDURE — 99214 OFFICE O/P EST MOD 30 MIN: CPT | Performed by: INTERNAL MEDICINE

## 2024-03-07 PROCEDURE — 3075F SYST BP GE 130 - 139MM HG: CPT | Performed by: INTERNAL MEDICINE

## 2024-03-07 PROCEDURE — 1123F ACP DISCUSS/DSCN MKR DOCD: CPT | Performed by: INTERNAL MEDICINE

## 2024-03-07 PROCEDURE — 3051F HG A1C>EQUAL 7.0%<8.0%: CPT | Performed by: INTERNAL MEDICINE

## 2024-03-07 RX ORDER — INSULIN GLARGINE AND LIXISENATIDE 100; 33 U/ML; UG/ML
30 INJECTION, SOLUTION SUBCUTANEOUS EVERY MORNING
Qty: 30 ML | Refills: 1 | Status: SHIPPED | OUTPATIENT
Start: 2024-03-07

## 2024-03-07 RX ORDER — LOSARTAN POTASSIUM AND HYDROCHLOROTHIAZIDE 12.5; 1 MG/1; MG/1
TABLET ORAL
Qty: 90 TABLET | Refills: 1 | Status: SHIPPED | OUTPATIENT
Start: 2024-03-07

## 2024-03-07 RX ORDER — DAPAGLIFLOZIN 10 MG/1
10 TABLET, FILM COATED ORAL EVERY MORNING
Qty: 90 TABLET | Refills: 1 | Status: SHIPPED | OUTPATIENT
Start: 2024-03-07

## 2024-03-07 RX ORDER — BLOOD SUGAR DIAGNOSTIC
1 STRIP MISCELLANEOUS 2 TIMES DAILY
Qty: 200 EACH | Refills: 3 | Status: SHIPPED | OUTPATIENT
Start: 2024-03-07

## 2024-03-07 RX ORDER — ATORVASTATIN CALCIUM 40 MG/1
TABLET, FILM COATED ORAL
Qty: 90 TABLET | Refills: 1 | Status: SHIPPED | OUTPATIENT
Start: 2024-03-07

## 2024-03-07 RX ORDER — GLIPIZIDE AND METFORMIN HCL 5; 500 MG/1; MG/1
TABLET, FILM COATED ORAL
Qty: 360 TABLET | Refills: 1 | Status: SHIPPED | OUTPATIENT
Start: 2024-03-07

## 2024-03-07 NOTE — PROGRESS NOTES
Rajesh Serrano is a 78 y.o. male who presents for Type 2 diabetes mellitus.     Current symptoms/problems include  hyperglycemia  and are worsening.     1.  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     Comorbid conditions:  hyperlipidemia, Neuropathy and Retinopathy    Current diabetic medications include: metaglip 5/500 mg 2 tabs bid, pioglitazone 15 mg qd, Soliqua 30 units qhs  Has a lot of stress    Intolerance to diabetes medications: Trulicity-nausea     Weight trend: stable  Prior visit with dietician: yes  Current diet: on average, 2 meals per day, eats moderately healthy  Current exercise: walks     Current monitoring regimen: home blood tests - 3 times daily  Has brought blood glucose log/meter:  Yes  Home blood sugar records: fasting range:  and postprandial range:    Any episodes of hypoglycemia? No  Hypoglycemia frequency and time(s):    Does patient have Glucagon emergency kit? No  Does patient have rapid acting carbohydrate?  Yes  Does patient wear a medic alert bracelet or necklace?  No    2. 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    3. Pure hypercholesterolemia  No muscle pain.    4. Essential hypertension  No headaches.    5. Overweight (BMI 25.0-29.9)  Eats moderately healthy.  Walks.    6. CKD stage 3 secondary to diabetes ([facility])  Has prostate problems, see Urologist.    7. Nephropathy  Has urination problems      Past Medical History:   Diagnosis Date    Elevated prostate specific antigen (PSA)     Esophageal reflux     Other and unspecified hyperlipidemia     Screening PSA (prostate specific antigen) 9/17/2009    2.9    Type 2 diabetes mellitus, uncontrolled     Dr. 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

## 2024-03-21 ENCOUNTER — TELEPHONE (OUTPATIENT)
Dept: ENDOCRINOLOGY | Age: 79
End: 2024-03-21

## 2024-03-21 NOTE — TELEPHONE ENCOUNTER
Submitted PA for Farxiga  Via [facility] Key: [id number]    STATUS: The patient currently has access to the requested medication and a Prior Authorization is not needed for the patient/medication.

## 2024-06-04 DIAGNOSIS — E78.00 PURE HYPERCHOLESTEROLEMIA: ICD-10-CM

## 2024-06-04 DIAGNOSIS — E11.40 POORLY CONTROLLED TYPE 2 DIABETES MELLITUS WITH NEUROPATHY (HCC): ICD-10-CM

## 2024-06-04 DIAGNOSIS — E11.65 POORLY CONTROLLED TYPE 2 DIABETES MELLITUS WITH NEUROPATHY (HCC): ICD-10-CM

## 2024-06-05 LAB
ALBUMIN SERPL-MCNC: 4.5 G/DL (ref 3.4–5)
ALBUMIN/GLOB SERPL: 1.6 {RATIO} (ref 1.1–2.2)
ALP SERPL-CCNC: 56 U/L (ref 40–129)
ALT SERPL-CCNC: 15 U/L (ref 10–40)
ANION GAP SERPL CALCULATED.3IONS-SCNC: 12 MMOL/L (ref 3–16)
AST SERPL-CCNC: 18 U/L (ref 15–37)
BILIRUB SERPL-MCNC: 0.5 MG/DL (ref 0–1)
BUN SERPL-MCNC: 46 MG/DL (ref 7–20)
CALCIUM SERPL-MCNC: 10.7 MG/DL (ref 8.3–10.6)
CHLORIDE SERPL-SCNC: 100 MMOL/L (ref 99–110)
CHOLEST SERPL-MCNC: 123 MG/DL (ref 0–199)
CO2 SERPL-SCNC: 25 MMOL/L (ref 21–32)
CREAT SERPL-MCNC: 1.5 MG/DL (ref 0.8–1.3)
CREAT UR-MCNC: 66.6 MG/DL (ref 39–259)
EST. AVERAGE GLUCOSE BLD GHB EST-MCNC: 165.7 MG/DL
GFR SERPLBLD CREATININE-BSD FMLA CKD-EPI: 48 ML/MIN/{1.73_M2}
GLUCOSE SERPL-MCNC: 73 MG/DL (ref 70–99)
HBA1C MFR BLD: 7.4 %
HDLC SERPL-MCNC: 64 MG/DL (ref 40–60)
LDL CHOLESTEROL: 42 MG/DL
MICROALBUMIN UR DL<=1MG/L-MCNC: 2.4 MG/DL
MICROALBUMIN/CREAT UR: 36 MG/G (ref 0–30)
POTASSIUM SERPL-SCNC: 5.3 MMOL/L (ref 3.5–5.1)
PROT SERPL-MCNC: 7.4 G/DL (ref 6.4–8.2)
SODIUM SERPL-SCNC: 137 MMOL/L (ref 136–145)
TRIGL SERPL-MCNC: 83 MG/DL (ref 0–150)
VLDLC SERPL CALC-MCNC: 17 MG/DL

## 2024-06-06 ENCOUNTER — OFFICE VISIT (OUTPATIENT)
Dept: ENDOCRINOLOGY | Age: 79
End: 2024-06-06
Payer: MEDICARE

## 2024-06-06 VITALS
TEMPERATURE: 98 F | HEIGHT: 72 IN | SYSTOLIC BLOOD PRESSURE: 127 MMHG | OXYGEN SATURATION: 99 % | BODY MASS INDEX: 27.5 KG/M2 | DIASTOLIC BLOOD PRESSURE: 89 MMHG | RESPIRATION RATE: 14 BRPM | WEIGHT: 203 LBS | HEART RATE: 75 BPM

## 2024-06-06 DIAGNOSIS — E11.3293 MILD NONPROLIFERATIVE DIABETIC RETINOPATHY OF BOTH EYES WITHOUT MACULAR EDEMA ASSOCIATED WITH TYPE 2 DIABETES MELLITUS (HCC): ICD-10-CM

## 2024-06-06 DIAGNOSIS — I10 ESSENTIAL HYPERTENSION: ICD-10-CM

## 2024-06-06 DIAGNOSIS — E78.00 PURE HYPERCHOLESTEROLEMIA: ICD-10-CM

## 2024-06-06 DIAGNOSIS — E11.65 POORLY CONTROLLED TYPE 2 DIABETES MELLITUS WITH NEUROPATHY (HCC): Primary | ICD-10-CM

## 2024-06-06 DIAGNOSIS — N18.30 STAGE 3 CHRONIC KIDNEY DISEASE, UNSPECIFIED WHETHER STAGE 3A OR 3B CKD (HCC): ICD-10-CM

## 2024-06-06 DIAGNOSIS — E11.40 POORLY CONTROLLED TYPE 2 DIABETES MELLITUS WITH NEUROPATHY (HCC): Primary | ICD-10-CM

## 2024-06-06 DIAGNOSIS — E66.3 OVERWEIGHT (BMI 25.0-29.9): ICD-10-CM

## 2024-06-06 DIAGNOSIS — E11.21 DIABETIC NEPHROPATHY ASSOCIATED WITH TYPE 2 DIABETES MELLITUS (HCC): ICD-10-CM

## 2024-06-06 PROCEDURE — 3079F DIAST BP 80-89 MM HG: CPT | Performed by: INTERNAL MEDICINE

## 2024-06-06 PROCEDURE — 99214 OFFICE O/P EST MOD 30 MIN: CPT | Performed by: INTERNAL MEDICINE

## 2024-06-06 PROCEDURE — 3074F SYST BP LT 130 MM HG: CPT | Performed by: INTERNAL MEDICINE

## 2024-06-06 PROCEDURE — 1123F ACP DISCUSS/DSCN MKR DOCD: CPT | Performed by: INTERNAL MEDICINE

## 2024-06-06 PROCEDURE — 3051F HG A1C>EQUAL 7.0%<8.0%: CPT | Performed by: INTERNAL MEDICINE

## 2024-06-06 RX ORDER — ATORVASTATIN CALCIUM 40 MG/1
TABLET, FILM COATED ORAL
Qty: 90 TABLET | Refills: 1 | Status: SHIPPED | OUTPATIENT
Start: 2024-06-06

## 2024-06-06 RX ORDER — GLIPIZIDE AND METFORMIN HCL 5; 500 MG/1; MG/1
TABLET, FILM COATED ORAL
Qty: 360 TABLET | Refills: 1 | Status: CANCELLED | OUTPATIENT
Start: 2024-06-06

## 2024-06-06 RX ORDER — DAPAGLIFLOZIN 10 MG/1
10 TABLET, FILM COATED ORAL EVERY MORNING
Qty: 90 TABLET | Refills: 1 | Status: CANCELLED | OUTPATIENT
Start: 2024-06-06

## 2024-06-06 RX ORDER — INSULIN GLARGINE AND LIXISENATIDE 100; 33 U/ML; UG/ML
36 INJECTION, SOLUTION SUBCUTANEOUS EVERY MORNING
Qty: 30 ML | Refills: 1
Start: 2024-06-06

## 2024-06-06 RX ORDER — LOSARTAN POTASSIUM AND HYDROCHLOROTHIAZIDE 12.5; 1 MG/1; MG/1
TABLET ORAL
Qty: 90 TABLET | Refills: 1 | Status: SHIPPED | OUTPATIENT
Start: 2024-06-06

## 2024-06-06 RX ORDER — PIOGLITAZONEHYDROCHLORIDE 15 MG/1
TABLET ORAL
Qty: 90 TABLET | Refills: 1 | Status: CANCELLED | OUTPATIENT
Start: 2024-06-06

## 2024-06-06 NOTE — PROGRESS NOTES
understands instructions and counseling.      Return in about 3 months (around 9/6/2024) for diabetes.

## 2024-07-31 ENCOUNTER — TELEPHONE (OUTPATIENT)
Dept: ENDOCRINOLOGY | Age: 79
End: 2024-07-31

## 2024-07-31 NOTE — TELEPHONE ENCOUNTER
Confirmed for [facility] today that per OV note from 6/6/24 patient has stopped the Metaglip combo and is now on metformin 1000 mg BID.

## 2024-08-07 DIAGNOSIS — E11.65 POORLY CONTROLLED TYPE 2 DIABETES MELLITUS WITH NEUROPATHY (HCC): ICD-10-CM

## 2024-08-07 DIAGNOSIS — E11.40 POORLY CONTROLLED TYPE 2 DIABETES MELLITUS WITH NEUROPATHY (HCC): ICD-10-CM

## 2024-08-08 RX ORDER — INSULIN GLARGINE AND LIXISENATIDE 100; 33 U/ML; UG/ML
INJECTION, SOLUTION SUBCUTANEOUS
Qty: 30 ML | Refills: 1 | Status: SHIPPED | OUTPATIENT
Start: 2024-08-08

## 2024-08-14 PROBLEM — E83.52 HYPERCALCEMIA: Status: ACTIVE | Noted: 2024-08-14

## 2024-08-15 ENCOUNTER — OFFICE VISIT (OUTPATIENT)
Dept: ENDOCRINOLOGY | Age: 79
End: 2024-08-15
Payer: MEDICARE

## 2024-08-15 VITALS
DIASTOLIC BLOOD PRESSURE: 70 MMHG | WEIGHT: 200 LBS | SYSTOLIC BLOOD PRESSURE: 127 MMHG | HEART RATE: 101 BPM | RESPIRATION RATE: 14 BRPM | TEMPERATURE: 98 F | OXYGEN SATURATION: 97 % | BODY MASS INDEX: 27.09 KG/M2 | HEIGHT: 72 IN

## 2024-08-15 DIAGNOSIS — N18.30 STAGE 3 CHRONIC KIDNEY DISEASE, UNSPECIFIED WHETHER STAGE 3A OR 3B CKD (HCC): ICD-10-CM

## 2024-08-15 DIAGNOSIS — E11.65 POORLY CONTROLLED TYPE 2 DIABETES MELLITUS WITH NEUROPATHY (HCC): Primary | ICD-10-CM

## 2024-08-15 DIAGNOSIS — E83.52 HYPERCALCEMIA: ICD-10-CM

## 2024-08-15 DIAGNOSIS — I10 ESSENTIAL HYPERTENSION: ICD-10-CM

## 2024-08-15 DIAGNOSIS — E11.21 DIABETIC NEPHROPATHY ASSOCIATED WITH TYPE 2 DIABETES MELLITUS (HCC): ICD-10-CM

## 2024-08-15 DIAGNOSIS — E11.3293 MILD NONPROLIFERATIVE DIABETIC RETINOPATHY OF BOTH EYES WITHOUT MACULAR EDEMA ASSOCIATED WITH TYPE 2 DIABETES MELLITUS (HCC): ICD-10-CM

## 2024-08-15 DIAGNOSIS — E78.00 PURE HYPERCHOLESTEROLEMIA: ICD-10-CM

## 2024-08-15 DIAGNOSIS — E66.3 OVERWEIGHT (BMI 25.0-29.9): ICD-10-CM

## 2024-08-15 DIAGNOSIS — E11.40 POORLY CONTROLLED TYPE 2 DIABETES MELLITUS WITH NEUROPATHY (HCC): Primary | ICD-10-CM

## 2024-08-15 PROCEDURE — 3074F SYST BP LT 130 MM HG: CPT | Performed by: INTERNAL MEDICINE

## 2024-08-15 PROCEDURE — 3078F DIAST BP <80 MM HG: CPT | Performed by: INTERNAL MEDICINE

## 2024-08-15 PROCEDURE — 3051F HG A1C>EQUAL 7.0%<8.0%: CPT | Performed by: INTERNAL MEDICINE

## 2024-08-15 PROCEDURE — 99214 OFFICE O/P EST MOD 30 MIN: CPT | Performed by: INTERNAL MEDICINE

## 2024-08-15 PROCEDURE — 1123F ACP DISCUSS/DSCN MKR DOCD: CPT | Performed by: INTERNAL MEDICINE

## 2024-08-15 RX ORDER — INSULIN GLARGINE AND LIXISENATIDE 100; 33 U/ML; UG/ML
36 INJECTION, SOLUTION SUBCUTANEOUS EVERY MORNING
Qty: 30 ML | Refills: 1
Start: 2024-08-15

## 2024-08-15 RX ORDER — PIOGLITAZONEHYDROCHLORIDE 15 MG/1
TABLET ORAL
Qty: 90 TABLET | Refills: 1 | Status: CANCELLED | OUTPATIENT
Start: 2024-08-15

## 2024-08-15 RX ORDER — KRILL/OM-3/DHA/EPA/PHOSPHO/AST 500MG-86MG
1 CAPSULE ORAL DAILY
COMMUNITY

## 2024-08-15 NOTE — PROGRESS NOTES
Rajesh Serrano is a 79 y.o. male who presents for Type 2 diabetes mellitus.     Current symptoms/problems include  hyperglycemia  and are worsening.     1.  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     Comorbid conditions:  hyperlipidemia, Neuropathy and Retinopathy    Current diabetic medications include: metformin 1000 mg 1 tablets bid, Soliqua 30 units in AM, Jardiance 10 mg daily  Has a lot of stress.  Patient experienced blood in the urine and has been undergoing workup by urology.  He had CT scan, cystoscopy, catheter placement in the ureter.    Intolerance to diabetes medications: Trulicity-nausea     Weight trend: stable  Prior visit with dietician: yes  Current diet: on average, 2 meals per day, eats moderately healthy  Current exercise: walks     Current monitoring regimen: home blood tests - 3 times daily  Has brought blood glucose log/meter:  Yes  Home blood sugar records: fasting range:  and postprandial range:    Any episodes of hypoglycemia? No  Hypoglycemia frequency and time(s):    Does patient have Glucagon emergency kit? No  Does patient have rapid acting carbohydrate?  Yes  Does patient wear a medic alert bracelet or necklace?  No    2. 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    3. Pure hypercholesterolemia  No muscle pain.    4. Essential hypertension  No headaches.    5. Overweight (BMI 25.0-29.9)  Eats moderately healthy.  Walks.    6. CKD stage 3 secondary to diabetes ([facility])  Has prostate problems, see Urologist.    7. Nephropathy  Has urination problems    8.  Hypercalcemia  No numbness or tingling  No history of kidney stones      Past Medical History:   Diagnosis Date    Elevated prostate specific antigen (PSA)     Esophageal reflux     Other and unspecified hyperlipidemia

## 2024-08-26 ENCOUNTER — TELEPHONE (OUTPATIENT)
Dept: ENDOCRINOLOGY | Age: 79
End: 2024-08-26

## 2024-08-26 RX ORDER — INSULIN LISPRO-AABC 100 [IU]/ML
INJECTION, SOLUTION SUBCUTANEOUS
Qty: 1 ADJUSTABLE DOSE PRE-FILLED PEN SYRINGE | Refills: 0 | Status: SHIPPED | COMMUNITY
Start: 2024-08-26

## 2024-08-26 NOTE — TELEPHONE ENCOUNTER
I spoke with patient.  Advised to increase Soliqua to 38 units in the morning.  Advised to monitor blood glucose 4 times daily.  Provided with Lyumjev pen to add a correction scale before meals 2 units for every 50 blood glucose above 150 if blood glucose is high.    Blood glucose today 157-112.  Let me know in 2 days his blood glucose levels.

## 2024-08-26 NOTE — TELEPHONE ENCOUNTER
Pt wife called back and said he was taken off jaridiance pt wife said he could die within 24 hrs and I said if you find this to be true please go to the hospital and she said no and wants a call from the dr asap

## 2024-08-26 NOTE — TELEPHONE ENCOUNTER
Call from pt stating that he needs to speak with Dr. Kam or Rose Marie to discuss his 8 day hospital stay after a severe UTI and to discuss his BP and BS     Please advise   CB# [phone]

## 2024-08-26 NOTE — TELEPHONE ENCOUNTER
Spoke w/ pt, informed to contact PCP in regards to BP.    Friday night - 370  Saturday - Soliqua 36 units, 257 AM, Night - almost 400  Sunday - 247 am, did not check at night  This morning - 157    36 units Soliqua q AM, Metformin 1000mg BID    They d/c'd Jardiance due to the UTI.    No steroids during hospitalization, ertapenem infusions at home daily. 1gm daily. PICC Line inserted.     On 8/7 they did a cystoscopy, they kept him that day, cleaned out the bladder cancer. They put a stent in. He developed fever, then went to hospital for a week. He just got out on Friday.

## 2024-09-20 ENCOUNTER — OFFICE VISIT (OUTPATIENT)
Dept: ENDOCRINOLOGY | Age: 79
End: 2024-09-20
Payer: MEDICARE

## 2024-09-20 VITALS
BODY MASS INDEX: 25.6 KG/M2 | RESPIRATION RATE: 14 BRPM | TEMPERATURE: 98 F | HEIGHT: 72 IN | DIASTOLIC BLOOD PRESSURE: 56 MMHG | WEIGHT: 189 LBS | HEART RATE: 108 BPM | OXYGEN SATURATION: 99 % | SYSTOLIC BLOOD PRESSURE: 101 MMHG

## 2024-09-20 DIAGNOSIS — E66.3 OVERWEIGHT (BMI 25.0-29.9): ICD-10-CM

## 2024-09-20 DIAGNOSIS — I10 ESSENTIAL HYPERTENSION: ICD-10-CM

## 2024-09-20 DIAGNOSIS — E11.21 DIABETIC NEPHROPATHY ASSOCIATED WITH TYPE 2 DIABETES MELLITUS (HCC): ICD-10-CM

## 2024-09-20 DIAGNOSIS — E11.40 POORLY CONTROLLED TYPE 2 DIABETES MELLITUS WITH NEUROPATHY (HCC): Primary | ICD-10-CM

## 2024-09-20 DIAGNOSIS — N18.30 STAGE 3 CHRONIC KIDNEY DISEASE, UNSPECIFIED WHETHER STAGE 3A OR 3B CKD (HCC): ICD-10-CM

## 2024-09-20 DIAGNOSIS — E78.00 PURE HYPERCHOLESTEROLEMIA: ICD-10-CM

## 2024-09-20 DIAGNOSIS — E83.52 HYPERCALCEMIA: ICD-10-CM

## 2024-09-20 DIAGNOSIS — E11.3293 MILD NONPROLIFERATIVE DIABETIC RETINOPATHY OF BOTH EYES WITHOUT MACULAR EDEMA ASSOCIATED WITH TYPE 2 DIABETES MELLITUS (HCC): ICD-10-CM

## 2024-09-20 DIAGNOSIS — E11.65 POORLY CONTROLLED TYPE 2 DIABETES MELLITUS WITH NEUROPATHY (HCC): Primary | ICD-10-CM

## 2024-09-20 PROCEDURE — NBSRV NON-BILLABLE SERVICE: Performed by: INTERNAL MEDICINE

## 2024-09-20 PROCEDURE — 3051F HG A1C>EQUAL 7.0%<8.0%: CPT | Performed by: INTERNAL MEDICINE

## 2024-09-20 PROCEDURE — 1123F ACP DISCUSS/DSCN MKR DOCD: CPT | Performed by: INTERNAL MEDICINE

## 2024-09-20 PROCEDURE — 3078F DIAST BP <80 MM HG: CPT | Performed by: INTERNAL MEDICINE

## 2024-09-20 PROCEDURE — G2211 COMPLEX E/M VISIT ADD ON: HCPCS | Performed by: INTERNAL MEDICINE

## 2024-09-20 PROCEDURE — 3074F SYST BP LT 130 MM HG: CPT | Performed by: INTERNAL MEDICINE

## 2024-09-20 PROCEDURE — 99215 OFFICE O/P EST HI 40 MIN: CPT | Performed by: INTERNAL MEDICINE

## 2024-09-20 RX ORDER — FAMOTIDINE 20 MG/1
20 TABLET, FILM COATED ORAL 2 TIMES DAILY
COMMUNITY

## 2024-09-20 RX ORDER — INSULIN ASPART INJECTION 100 [IU]/ML
INJECTION, SOLUTION SUBCUTANEOUS
Qty: 2 ADJUSTABLE DOSE PRE-FILLED PEN SYRINGE | Refills: 0 | Status: SHIPPED | COMMUNITY
Start: 2024-09-20

## 2024-09-20 RX ORDER — INSULIN GLARGINE AND LIXISENATIDE 100; 33 U/ML; UG/ML
44 INJECTION, SOLUTION SUBCUTANEOUS EVERY MORNING
Qty: 5 ADJUSTABLE DOSE PRE-FILLED PEN SYRINGE | Refills: 3
Start: 2024-09-20 | End: 2024-09-21 | Stop reason: SDUPTHER

## 2024-09-20 RX ORDER — BLOOD-GLUCOSE METER
1 EACH MISCELLANEOUS
Qty: 200 EACH | Refills: 5 | Status: SHIPPED | OUTPATIENT
Start: 2024-09-20

## 2024-09-20 RX ORDER — ERTAPENEM 1 G/1
1 INJECTION, POWDER, LYOPHILIZED, FOR SOLUTION INTRAMUSCULAR; INTRAVENOUS EVERY 24 HOURS
COMMUNITY
Start: 2024-09-11

## 2024-09-21 ENCOUNTER — TELEPHONE (OUTPATIENT)
Dept: ENDOCRINOLOGY | Age: 79
End: 2024-09-21

## 2024-09-21 DIAGNOSIS — I10 ESSENTIAL HYPERTENSION: ICD-10-CM

## 2024-09-21 DIAGNOSIS — E11.40 POORLY CONTROLLED TYPE 2 DIABETES MELLITUS WITH NEUROPATHY (HCC): ICD-10-CM

## 2024-09-21 DIAGNOSIS — E11.3293 MILD NONPROLIFERATIVE DIABETIC RETINOPATHY OF BOTH EYES WITHOUT MACULAR EDEMA ASSOCIATED WITH TYPE 2 DIABETES MELLITUS (HCC): ICD-10-CM

## 2024-09-21 DIAGNOSIS — E11.65 POORLY CONTROLLED TYPE 2 DIABETES MELLITUS WITH NEUROPATHY (HCC): ICD-10-CM

## 2024-09-21 DIAGNOSIS — E83.52 HYPERCALCEMIA: ICD-10-CM

## 2024-09-21 DIAGNOSIS — E78.00 PURE HYPERCHOLESTEROLEMIA: ICD-10-CM

## 2024-09-21 DIAGNOSIS — E11.21 DIABETIC NEPHROPATHY ASSOCIATED WITH TYPE 2 DIABETES MELLITUS (HCC): ICD-10-CM

## 2024-09-21 RX ORDER — INSULIN GLARGINE AND LIXISENATIDE 100; 33 U/ML; UG/ML
46 INJECTION, SOLUTION SUBCUTANEOUS EVERY MORNING
Qty: 5 ADJUSTABLE DOSE PRE-FILLED PEN SYRINGE | Refills: 3
Start: 2024-09-21

## 2024-09-23 ENCOUNTER — NURSE ONLY (OUTPATIENT)
Dept: ENDOCRINOLOGY | Age: 79
End: 2024-09-23

## 2024-10-23 ENCOUNTER — TELEPHONE (OUTPATIENT)
Dept: ENDOCRINOLOGY | Age: 79
End: 2024-10-23

## 2024-11-08 ENCOUNTER — TELEPHONE (OUTPATIENT)
Dept: ENDOCRINOLOGY | Age: 79
End: 2024-11-08

## 2024-11-08 NOTE — TELEPHONE ENCOUNTER
Patient called requesting a refill     Rx- metFORMIN (GLUCOPHAGE) 1000 MG tablet    Pharmacy- [facility]    LOV-  NOV- 12/17/24    Please advise

## 2024-12-02 ENCOUNTER — TELEPHONE (OUTPATIENT)
Dept: ENDOCRINOLOGY | Age: 79
End: 2024-12-02

## 2024-12-02 DIAGNOSIS — E11.65 POORLY CONTROLLED TYPE 2 DIABETES MELLITUS WITH NEUROPATHY (HCC): ICD-10-CM

## 2024-12-02 DIAGNOSIS — I10 ESSENTIAL HYPERTENSION: ICD-10-CM

## 2024-12-02 DIAGNOSIS — E11.3293 MILD NONPROLIFERATIVE DIABETIC RETINOPATHY OF BOTH EYES WITHOUT MACULAR EDEMA ASSOCIATED WITH TYPE 2 DIABETES MELLITUS (HCC): ICD-10-CM

## 2024-12-02 DIAGNOSIS — I10 ESSENTIAL HYPERTENSION: Primary | ICD-10-CM

## 2024-12-02 DIAGNOSIS — E78.00 PURE HYPERCHOLESTEROLEMIA: ICD-10-CM

## 2024-12-02 DIAGNOSIS — E11.40 POORLY CONTROLLED TYPE 2 DIABETES MELLITUS WITH NEUROPATHY (HCC): ICD-10-CM

## 2024-12-02 RX ORDER — AMLODIPINE BESYLATE 5 MG/1
5 TABLET ORAL DAILY
Qty: 7 TABLET | Refills: 0 | Status: SHIPPED | OUTPATIENT
Start: 2024-12-02

## 2024-12-02 RX ORDER — ATORVASTATIN CALCIUM 40 MG/1
TABLET, FILM COATED ORAL
Qty: 90 TABLET | Refills: 0 | Status: SHIPPED | OUTPATIENT
Start: 2024-12-02

## 2024-12-02 NOTE — TELEPHONE ENCOUNTER
Fax from [facility] on [address]    Pt request new request 90 day Atorvastatin

## 2024-12-02 NOTE — TELEPHONE ENCOUNTER
Patient's wife called asking for prescription for amlodipine.  I advised patient that we sent prescription for atorvastatin.  Amlodipine was prescribed by Dr. Kylee Laughlin and I advised to request amlodipine from family doctor.  However, this is after office hours and patient is completely out of amlodipine.  I sent prescription for 1 week supply in case he has difficulty reaching PCP office.

## 2024-12-11 ENCOUNTER — TELEPHONE (OUTPATIENT)
Dept: ENDOCRINOLOGY | Age: 79
End: 2024-12-11

## 2024-12-11 NOTE — TELEPHONE ENCOUNTER
I spoke with patient.  Advised do not inject Soliqua in the morning.  Okay to inject after procedure.  Patient will have procedure at 9, will be done within few hours.

## 2024-12-11 NOTE — TELEPHONE ENCOUNTER
Pt wife called in and said pt has a procedure tomorrow with no food allowed and needs to know how to take insulin please advise

## 2024-12-16 DIAGNOSIS — I10 ESSENTIAL HYPERTENSION: ICD-10-CM

## 2024-12-16 DIAGNOSIS — E78.00 PURE HYPERCHOLESTEROLEMIA: ICD-10-CM

## 2024-12-16 DIAGNOSIS — E83.52 HYPERCALCEMIA: ICD-10-CM

## 2024-12-16 DIAGNOSIS — E11.65 POORLY CONTROLLED TYPE 2 DIABETES MELLITUS WITH NEUROPATHY (HCC): ICD-10-CM

## 2024-12-16 DIAGNOSIS — E11.3293 MILD NONPROLIFERATIVE DIABETIC RETINOPATHY OF BOTH EYES WITHOUT MACULAR EDEMA ASSOCIATED WITH TYPE 2 DIABETES MELLITUS (HCC): ICD-10-CM

## 2024-12-16 DIAGNOSIS — E11.21 DIABETIC NEPHROPATHY ASSOCIATED WITH TYPE 2 DIABETES MELLITUS (HCC): ICD-10-CM

## 2024-12-16 DIAGNOSIS — E11.40 POORLY CONTROLLED TYPE 2 DIABETES MELLITUS WITH NEUROPATHY (HCC): ICD-10-CM

## 2024-12-16 LAB
25(OH)D3 SERPL-MCNC: 34.6 NG/ML
ALBUMIN SERPL-MCNC: 4 G/DL (ref 3.4–5)
ALBUMIN/GLOB SERPL: 1.3 {RATIO} (ref 1.1–2.2)
ALP SERPL-CCNC: 74 U/L (ref 40–129)
ALT SERPL-CCNC: 35 U/L (ref 10–40)
ANION GAP SERPL CALCULATED.3IONS-SCNC: 11 MMOL/L (ref 3–16)
AST SERPL-CCNC: 24 U/L (ref 15–37)
BILIRUB SERPL-MCNC: 0.4 MG/DL (ref 0–1)
BUN SERPL-MCNC: 25 MG/DL (ref 7–20)
CALCIUM SERPL-MCNC: 10.3 MG/DL (ref 8.3–10.6)
CHLORIDE SERPL-SCNC: 102 MMOL/L (ref 99–110)
CHOLEST SERPL-MCNC: 128 MG/DL (ref 0–199)
CO2 SERPL-SCNC: 27 MMOL/L (ref 21–32)
CREAT SERPL-MCNC: 1.2 MG/DL (ref 0.8–1.3)
EST. AVERAGE GLUCOSE BLD GHB EST-MCNC: 200.1 MG/DL
GFR SERPLBLD CREATININE-BSD FMLA CKD-EPI: 62 ML/MIN/{1.73_M2}
GLUCOSE SERPL-MCNC: 75 MG/DL (ref 70–99)
HBA1C MFR BLD: 8.6 %
HDLC SERPL-MCNC: 53 MG/DL (ref 40–60)
LDL CHOLESTEROL: 54 MG/DL
MAGNESIUM SERPL-MCNC: 1.49 MG/DL (ref 1.8–2.4)
PHOSPHATE SERPL-MCNC: 3.1 MG/DL (ref 2.5–4.9)
POTASSIUM SERPL-SCNC: 5 MMOL/L (ref 3.5–5.1)
PROT SERPL-MCNC: 7.1 G/DL (ref 6.4–8.2)
PTH-INTACT SERPL-MCNC: 43.9 PG/ML (ref 14–72)
SODIUM SERPL-SCNC: 140 MMOL/L (ref 136–145)
T4 FREE SERPL-MCNC: 1.3 NG/DL (ref 0.9–1.8)
TRIGL SERPL-MCNC: 105 MG/DL (ref 0–150)
TSH SERPL DL<=0.005 MIU/L-ACNC: 1.25 UIU/ML (ref 0.27–4.2)
VLDLC SERPL CALC-MCNC: 21 MG/DL

## 2024-12-17 ENCOUNTER — OFFICE VISIT (OUTPATIENT)
Dept: ENDOCRINOLOGY | Age: 79
End: 2024-12-17
Payer: MEDICARE

## 2024-12-17 VITALS
OXYGEN SATURATION: 99 % | RESPIRATION RATE: 14 BRPM | HEART RATE: 81 BPM | SYSTOLIC BLOOD PRESSURE: 137 MMHG | HEIGHT: 72 IN | WEIGHT: 195.6 LBS | BODY MASS INDEX: 26.49 KG/M2 | TEMPERATURE: 97 F | DIASTOLIC BLOOD PRESSURE: 70 MMHG

## 2024-12-17 DIAGNOSIS — E11.21 DIABETIC NEPHROPATHY ASSOCIATED WITH TYPE 2 DIABETES MELLITUS (HCC): ICD-10-CM

## 2024-12-17 DIAGNOSIS — E83.52 HYPERCALCEMIA: ICD-10-CM

## 2024-12-17 DIAGNOSIS — N18.30 STAGE 3 CHRONIC KIDNEY DISEASE, UNSPECIFIED WHETHER STAGE 3A OR 3B CKD (HCC): ICD-10-CM

## 2024-12-17 DIAGNOSIS — E78.00 PURE HYPERCHOLESTEROLEMIA: ICD-10-CM

## 2024-12-17 DIAGNOSIS — E11.65 POORLY CONTROLLED TYPE 2 DIABETES MELLITUS WITH NEUROPATHY (HCC): Primary | ICD-10-CM

## 2024-12-17 DIAGNOSIS — E11.40 POORLY CONTROLLED TYPE 2 DIABETES MELLITUS WITH NEUROPATHY (HCC): Primary | ICD-10-CM

## 2024-12-17 DIAGNOSIS — E11.3293 MILD NONPROLIFERATIVE DIABETIC RETINOPATHY OF BOTH EYES WITHOUT MACULAR EDEMA ASSOCIATED WITH TYPE 2 DIABETES MELLITUS (HCC): ICD-10-CM

## 2024-12-17 DIAGNOSIS — I10 ESSENTIAL HYPERTENSION: ICD-10-CM

## 2024-12-17 DIAGNOSIS — E66.3 OVERWEIGHT (BMI 25.0-29.9): ICD-10-CM

## 2024-12-17 PROCEDURE — 3075F SYST BP GE 130 - 139MM HG: CPT | Performed by: INTERNAL MEDICINE

## 2024-12-17 PROCEDURE — NBSRV NON-BILLABLE SERVICE: Performed by: INTERNAL MEDICINE

## 2024-12-17 PROCEDURE — G2211 COMPLEX E/M VISIT ADD ON: HCPCS | Performed by: INTERNAL MEDICINE

## 2024-12-17 PROCEDURE — 3078F DIAST BP <80 MM HG: CPT | Performed by: INTERNAL MEDICINE

## 2024-12-17 PROCEDURE — 1159F MED LIST DOCD IN RCRD: CPT | Performed by: INTERNAL MEDICINE

## 2024-12-17 PROCEDURE — 1160F RVW MEDS BY RX/DR IN RCRD: CPT | Performed by: INTERNAL MEDICINE

## 2024-12-17 PROCEDURE — 3052F HG A1C>EQUAL 8.0%<EQUAL 9.0%: CPT | Performed by: INTERNAL MEDICINE

## 2024-12-17 PROCEDURE — 99214 OFFICE O/P EST MOD 30 MIN: CPT | Performed by: INTERNAL MEDICINE

## 2024-12-17 PROCEDURE — 1123F ACP DISCUSS/DSCN MKR DOCD: CPT | Performed by: INTERNAL MEDICINE

## 2024-12-17 RX ORDER — ATORVASTATIN CALCIUM 40 MG/1
TABLET, FILM COATED ORAL
Qty: 90 TABLET | Refills: 1 | Status: SHIPPED | OUTPATIENT
Start: 2024-12-17

## 2024-12-17 RX ORDER — LOSARTAN POTASSIUM AND HYDROCHLOROTHIAZIDE 12.5; 1 MG/1; MG/1
TABLET ORAL
Qty: 90 TABLET | Refills: 1 | Status: SHIPPED | OUTPATIENT
Start: 2024-12-17

## 2024-12-17 RX ORDER — MOMETASONE FUROATE MONOHYDRATE 50 UG/1
2 SPRAY, METERED NASAL DAILY
COMMUNITY
Start: 2024-10-09

## 2024-12-17 RX ORDER — INSULIN GLARGINE AND LIXISENATIDE 100; 33 U/ML; UG/ML
55 INJECTION, SOLUTION SUBCUTANEOUS EVERY MORNING
Qty: 90 ML | Refills: 2 | Status: SHIPPED | OUTPATIENT
Start: 2024-12-17

## 2024-12-17 RX ORDER — FLUTICASONE PROPIONATE 50 MCG
2 SPRAY, SUSPENSION (ML) NASAL DAILY
COMMUNITY

## 2024-12-17 NOTE — PROGRESS NOTES
Rajesh Serrano is a 79 y.o. male who presents for Type 2 diabetes mellitus.     Current symptoms/problems include  hyperglycemia  and are worsening.     1.  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     Comorbid conditions:  hyperlipidemia, Neuropathy and Retinopathy    Current diabetic medications include: metformin 1000 mg 1 tablets bid, Soliqua 55 units in AM  Has a lot of stress.  Dx with bladder cancer, had urosepsis.   Had TURP.    Intolerance to diabetes medications: Trulicity-nausea     Weight trend: stable  Prior visit with dietician: yes  Current diet: on average, 2 meals per day, eats moderately healthy  Current exercise: walks     Current monitoring regimen: home blood tests - 4-5 times daily  Has brought blood glucose log/meter:  Yes  Home blood sugar records: fasting range:  and postprandial range:    Any episodes of hypoglycemia? No  Hypoglycemia frequency and time(s):    Does patient have Glucagon emergency kit? No  Does patient have rapid acting carbohydrate?  Yes  Does patient wear a medic alert bracelet or necklace?  No    2. 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    3. Pure hypercholesterolemia  No muscle pain.    4. Essential hypertension  No headaches.    5. Overweight (BMI 25.0-29.9)  Eats moderately healthy.  Walks.    6. CKD stage 3 secondary to diabetes (HCC)  Has prostate problems, see Urologist.    7. Nephropathy  Has urination problems    8.  Hypercalcemia  No numbness or tingling  No history of kidney stones      Past Medical History:   Diagnosis Date    Elevated prostate specific antigen (PSA)     Esophageal reflux     Other and unspecified hyperlipidemia     Screening PSA (prostate specific antigen) 9/17/2009    2.9    Type 2 diabetes mellitus, uncontrolled     Dr. Concepcion

## 2024-12-18 LAB
CA-I ADJ PH7.4 SERPL-SCNC: 1.33 MMOL/L (ref 1.09–1.3)
CA-I SERPL ISE-SCNC: 1.3 MMOL/L (ref 1.09–1.3)

## 2025-03-06 ENCOUNTER — OFFICE VISIT (OUTPATIENT)
Dept: ENDOCRINOLOGY | Age: 80
End: 2025-03-06
Payer: MEDICARE

## 2025-03-06 VITALS
HEART RATE: 70 BPM | DIASTOLIC BLOOD PRESSURE: 89 MMHG | TEMPERATURE: 97 F | WEIGHT: 200 LBS | RESPIRATION RATE: 14 BRPM | OXYGEN SATURATION: 98 % | SYSTOLIC BLOOD PRESSURE: 137 MMHG | BODY MASS INDEX: 27.09 KG/M2 | HEIGHT: 72 IN

## 2025-03-06 DIAGNOSIS — N18.30 STAGE 3 CHRONIC KIDNEY DISEASE, UNSPECIFIED WHETHER STAGE 3A OR 3B CKD (HCC): ICD-10-CM

## 2025-03-06 DIAGNOSIS — E11.40 POORLY CONTROLLED TYPE 2 DIABETES MELLITUS WITH NEUROPATHY (HCC): Primary | ICD-10-CM

## 2025-03-06 DIAGNOSIS — I10 ESSENTIAL HYPERTENSION: ICD-10-CM

## 2025-03-06 DIAGNOSIS — E11.21 DIABETIC NEPHROPATHY ASSOCIATED WITH TYPE 2 DIABETES MELLITUS (HCC): ICD-10-CM

## 2025-03-06 DIAGNOSIS — E66.3 OVERWEIGHT (BMI 25.0-29.9): ICD-10-CM

## 2025-03-06 DIAGNOSIS — E11.3293 MILD NONPROLIFERATIVE DIABETIC RETINOPATHY OF BOTH EYES WITHOUT MACULAR EDEMA ASSOCIATED WITH TYPE 2 DIABETES MELLITUS (HCC): ICD-10-CM

## 2025-03-06 DIAGNOSIS — E78.00 PURE HYPERCHOLESTEROLEMIA: ICD-10-CM

## 2025-03-06 DIAGNOSIS — E11.65 POORLY CONTROLLED TYPE 2 DIABETES MELLITUS WITH NEUROPATHY (HCC): Primary | ICD-10-CM

## 2025-03-06 DIAGNOSIS — E83.52 HYPERCALCEMIA: ICD-10-CM

## 2025-03-06 PROCEDURE — NBSRV NON-BILLABLE SERVICE: Performed by: INTERNAL MEDICINE

## 2025-03-06 PROCEDURE — 1159F MED LIST DOCD IN RCRD: CPT | Performed by: INTERNAL MEDICINE

## 2025-03-06 PROCEDURE — 1123F ACP DISCUSS/DSCN MKR DOCD: CPT | Performed by: INTERNAL MEDICINE

## 2025-03-06 PROCEDURE — G2211 COMPLEX E/M VISIT ADD ON: HCPCS | Performed by: INTERNAL MEDICINE

## 2025-03-06 PROCEDURE — 3079F DIAST BP 80-89 MM HG: CPT | Performed by: INTERNAL MEDICINE

## 2025-03-06 PROCEDURE — 3075F SYST BP GE 130 - 139MM HG: CPT | Performed by: INTERNAL MEDICINE

## 2025-03-06 PROCEDURE — 99214 OFFICE O/P EST MOD 30 MIN: CPT | Performed by: INTERNAL MEDICINE

## 2025-03-06 PROCEDURE — 1160F RVW MEDS BY RX/DR IN RCRD: CPT | Performed by: INTERNAL MEDICINE

## 2025-03-06 RX ORDER — MULTIVITAMIN WITH IRON
250 TABLET ORAL DAILY
COMMUNITY

## 2025-03-06 RX ORDER — FLUTICASONE FUROATE 100 UG/1
POWDER RESPIRATORY (INHALATION)
COMMUNITY
Start: 2025-01-10

## 2025-03-06 RX ORDER — INSULIN GLARGINE AND LIXISENATIDE 100; 33 U/ML; UG/ML
55 INJECTION, SOLUTION SUBCUTANEOUS EVERY MORNING
Qty: 90 ML | Refills: 2 | Status: SHIPPED | OUTPATIENT
Start: 2025-03-06

## 2025-03-06 RX ORDER — BLOOD-GLUCOSE METER
1 EACH MISCELLANEOUS
Qty: 200 EACH | Refills: 5 | Status: SHIPPED | OUTPATIENT
Start: 2025-03-06

## 2025-03-06 NOTE — PROGRESS NOTES
10.6  PTH 43.9  Phosphorus 3.1  Magnesium 1.49  25-hydroxy vitamin D34.6  No history of fractures  No history of kidney stones  Vitamin D 2000 IU daily  No calcium supplements, no Tums  Takes magnesium supplement.  -CMP, PTH, ionized calcium, phosphorus, magnesium    Reviewed and/or ordered clinical lab results Yes  Reviewed and/or ordered radiology tests No  Reviewed and/or ordered other diagnostic tests No  Discussed test results with performing physician No  Independently reviewed image, tracing, or specimen No  Made a decision to obtain old records No  Reviewed and summarized old records Yes  Hemoglobin A1c 8.5  Obtained history from other than patient No    Rajesh FARRAR Zach was counseled regarding symptoms of diabetes diagnosis, course and complications of disease if inadequately treated, side effects of medications, diagnosis, treatment options, and prognosis, risks, benefits, complications, and alternatives of treatment, labs, imaging and other studies and treatment targets and goals, CGM placement, fingersticks 4-5 times daily, Fiasp correction scale, Soliqua adjustments, based on prandial glucose production, basal and prandial insulin requirements.  He understands instructions and counseling.      Return in about 3 months (around 6/6/2025) for diabetes.

## 2025-04-15 ENCOUNTER — TELEPHONE (OUTPATIENT)
Dept: ENDOCRINOLOGY | Age: 80
End: 2025-04-15

## 2025-04-15 NOTE — TELEPHONE ENCOUNTER
Pt wife called in and said dr burden/staff made a mistake and errors on pt supplies. Pt called in to see where test strips have been sent to and I let her know on 3-6-25 they were sent to [facility] and asked if she would like their number or if they would like us to send somewhere else. Pt stated I need to  the phone and call [facility] myself with her on the line since it was our fault and I said I am sorry we do not do that. Pt asked to speak to nurse

## 2025-05-30 DIAGNOSIS — E83.52 HYPERCALCEMIA: ICD-10-CM

## 2025-05-30 LAB
MAGNESIUM SERPL-MCNC: 1.74 MG/DL (ref 1.8–2.4)
PHOSPHATE SERPL-MCNC: 3.2 MG/DL (ref 2.5–4.9)
PTH-INTACT SERPL-MCNC: 63.9 PG/ML (ref 14–72)

## 2025-06-01 LAB
CA-I ADJ PH7.4 SERPL-SCNC: 1.32 MMOL/L (ref 1.09–1.3)
CA-I SERPL ISE-SCNC: 1.27 MMOL/L (ref 1.09–1.3)

## 2025-06-02 ENCOUNTER — OFFICE VISIT (OUTPATIENT)
Dept: ENDOCRINOLOGY | Age: 80
End: 2025-06-02
Payer: MEDICARE

## 2025-06-02 VITALS
OXYGEN SATURATION: 97 % | TEMPERATURE: 98 F | RESPIRATION RATE: 14 BRPM | SYSTOLIC BLOOD PRESSURE: 128 MMHG | BODY MASS INDEX: 27.36 KG/M2 | HEART RATE: 69 BPM | WEIGHT: 202 LBS | HEIGHT: 72 IN | DIASTOLIC BLOOD PRESSURE: 62 MMHG

## 2025-06-02 DIAGNOSIS — E11.3293 MILD NONPROLIFERATIVE DIABETIC RETINOPATHY OF BOTH EYES WITHOUT MACULAR EDEMA ASSOCIATED WITH TYPE 2 DIABETES MELLITUS (HCC): ICD-10-CM

## 2025-06-02 DIAGNOSIS — E83.52 HYPERCALCEMIA: ICD-10-CM

## 2025-06-02 DIAGNOSIS — N18.30 STAGE 3 CHRONIC KIDNEY DISEASE, UNSPECIFIED WHETHER STAGE 3A OR 3B CKD (HCC): ICD-10-CM

## 2025-06-02 DIAGNOSIS — E78.00 PURE HYPERCHOLESTEROLEMIA: ICD-10-CM

## 2025-06-02 DIAGNOSIS — E11.65 POORLY CONTROLLED TYPE 2 DIABETES MELLITUS WITH NEUROPATHY (HCC): Primary | ICD-10-CM

## 2025-06-02 DIAGNOSIS — E66.3 OVERWEIGHT (BMI 25.0-29.9): ICD-10-CM

## 2025-06-02 DIAGNOSIS — I10 ESSENTIAL HYPERTENSION: ICD-10-CM

## 2025-06-02 DIAGNOSIS — E11.40 POORLY CONTROLLED TYPE 2 DIABETES MELLITUS WITH NEUROPATHY (HCC): Primary | ICD-10-CM

## 2025-06-02 DIAGNOSIS — E11.21 DIABETIC NEPHROPATHY ASSOCIATED WITH TYPE 2 DIABETES MELLITUS (HCC): ICD-10-CM

## 2025-06-02 PROCEDURE — 3078F DIAST BP <80 MM HG: CPT | Performed by: INTERNAL MEDICINE

## 2025-06-02 PROCEDURE — 99214 OFFICE O/P EST MOD 30 MIN: CPT | Performed by: INTERNAL MEDICINE

## 2025-06-02 PROCEDURE — G2211 COMPLEX E/M VISIT ADD ON: HCPCS | Performed by: INTERNAL MEDICINE

## 2025-06-02 PROCEDURE — 1123F ACP DISCUSS/DSCN MKR DOCD: CPT | Performed by: INTERNAL MEDICINE

## 2025-06-02 PROCEDURE — 3074F SYST BP LT 130 MM HG: CPT | Performed by: INTERNAL MEDICINE

## 2025-06-02 PROCEDURE — NBSRV NON-BILLABLE SERVICE: Performed by: INTERNAL MEDICINE

## 2025-06-02 PROCEDURE — 1160F RVW MEDS BY RX/DR IN RCRD: CPT | Performed by: INTERNAL MEDICINE

## 2025-06-02 PROCEDURE — 1159F MED LIST DOCD IN RCRD: CPT | Performed by: INTERNAL MEDICINE

## 2025-06-02 RX ORDER — INSULIN GLARGINE AND LIXISENATIDE 100; 33 U/ML; UG/ML
55 INJECTION, SOLUTION SUBCUTANEOUS EVERY MORNING
Qty: 90 ML | Refills: 2 | Status: SHIPPED | OUTPATIENT
Start: 2025-06-02

## 2025-06-02 RX ORDER — PEN NEEDLE, DIABETIC 31 GX5/16"
1 NEEDLE, DISPOSABLE MISCELLANEOUS DAILY
Qty: 100 EACH | Refills: 3 | Status: CANCELLED | OUTPATIENT
Start: 2025-06-02

## 2025-06-02 RX ORDER — AZELASTINE HYDROCHLORIDE 137 UG/1
1 SPRAY, METERED NASAL DAILY
COMMUNITY

## 2025-06-02 RX ORDER — BLOOD-GLUCOSE METER
1 EACH MISCELLANEOUS
Qty: 200 EACH | Refills: 5 | Status: SHIPPED | OUTPATIENT
Start: 2025-06-02

## 2025-06-02 NOTE — PROGRESS NOTES
(201 lb 3.2 oz)   03/06/25 90.7 kg (200 lb)     Body mass index is 27.39 kg/m².      OBJECTIVE:  Constitutional: no acute distress, well appearing and well nourished  Psychiatric: oriented to person, place and time, judgement and insight and normal, recent and remote memory and intact and mood and affect are normal  Skin: skin and subcutaneous tissue is normal without mass, normal turgor  Head and Face: examination of head and face revealed no abnormalities  Eyes: no lid or conjunctival swelling, erythema or discharge, pupils are normal, equal, round, reactive to light  Ears/Nose: external inspection of ears and nose revealed no abnormalities, hearing is grossly normal  Oropharynx/Mouth/Face: lips, tongue and gums are normal with no lesions, the voice quality was normal  Neck: neck is supple and symmetric, with midline trachea and no masses, thyroid is normal  Lymphatics: normal cervical lymph nodes, normal supraclavicular nodes  Pulmonary: no increased work of breathing or signs of respiratory distress, lungs are clear to auscultation  Cardiovascular: normal heart rate and rhythm, normal S1 and S2, no murmurs and pedal pulses and 2+ bilaterally, No edema  Abdomen: abdomen is soft, non-tender with no masses  Musculoskeletal: normal gait and station and exam of the digits and nails are normal  Neurological: normal coordination and normal general cortical function    Visual inspection:  Deformity/amputation: absent  Skin lesions/pre-ulcerative calluses: present calluses  Edema: right- negative, left- negative     Sensory exam:  Monofilament sensation: normal  (minimum of 5 random plantar locations tested, avoiding callused areas - > 1 area with absence of sensation is + for neuropathy)     Plus at least one of the following:  Pulses: normal  Proprioception: Intact  Vibration (128 Hz): Impaired    ASSESSMENT/PLAN:  1. DM type 2, poorly controlled, with neuropathy (HCC)  Patient improved diet  Immunotherapy for bladder

## 2025-07-04 DIAGNOSIS — E11.65 POORLY CONTROLLED TYPE 2 DIABETES MELLITUS WITH NEUROPATHY (HCC): ICD-10-CM

## 2025-07-04 DIAGNOSIS — E78.00 PURE HYPERCHOLESTEROLEMIA: ICD-10-CM

## 2025-07-04 DIAGNOSIS — E11.40 POORLY CONTROLLED TYPE 2 DIABETES MELLITUS WITH NEUROPATHY (HCC): ICD-10-CM

## 2025-07-04 DIAGNOSIS — E11.3293 MILD NONPROLIFERATIVE DIABETIC RETINOPATHY OF BOTH EYES WITHOUT MACULAR EDEMA ASSOCIATED WITH TYPE 2 DIABETES MELLITUS (HCC): ICD-10-CM

## 2025-07-04 DIAGNOSIS — I10 ESSENTIAL HYPERTENSION: ICD-10-CM

## 2025-07-07 RX ORDER — LOSARTAN POTASSIUM AND HYDROCHLOROTHIAZIDE 12.5; 1 MG/1; MG/1
1 TABLET ORAL DAILY
Qty: 90 TABLET | Refills: 1 | Status: SHIPPED | OUTPATIENT
Start: 2025-07-07

## 2025-08-20 ENCOUNTER — OFFICE VISIT (OUTPATIENT)
Dept: ORTHOPEDIC SURGERY | Age: 80
End: 2025-08-20
Payer: MEDICARE

## 2025-08-20 VITALS — WEIGHT: 203 LBS | BODY MASS INDEX: 27.5 KG/M2 | HEIGHT: 72 IN

## 2025-08-20 DIAGNOSIS — M75.01 ADHESIVE CAPSULITIS OF RIGHT SHOULDER: ICD-10-CM

## 2025-08-20 DIAGNOSIS — M19.011 OSTEOARTHRITIS OF RIGHT SHOULDER, UNSPECIFIED OSTEOARTHRITIS TYPE: ICD-10-CM

## 2025-08-20 DIAGNOSIS — M25.511 RIGHT SHOULDER PAIN, UNSPECIFIED CHRONICITY: Primary | ICD-10-CM

## 2025-08-20 PROCEDURE — 99203 OFFICE O/P NEW LOW 30 MIN: CPT | Performed by: ORTHOPAEDIC SURGERY

## 2025-08-20 PROCEDURE — 1125F AMNT PAIN NOTED PAIN PRSNT: CPT | Performed by: ORTHOPAEDIC SURGERY

## 2025-08-20 PROCEDURE — 1123F ACP DISCUSS/DSCN MKR DOCD: CPT | Performed by: ORTHOPAEDIC SURGERY

## 2025-08-20 PROCEDURE — 1159F MED LIST DOCD IN RCRD: CPT | Performed by: ORTHOPAEDIC SURGERY

## 2025-08-20 RX ORDER — IPRATROPIUM BROMIDE 21 UG/1
2 SPRAY, METERED NASAL 3 TIMES DAILY
COMMUNITY
Start: 2025-07-25